# Patient Record
Sex: FEMALE | Race: WHITE | Employment: OTHER | URBAN - METROPOLITAN AREA
[De-identification: names, ages, dates, MRNs, and addresses within clinical notes are randomized per-mention and may not be internally consistent; named-entity substitution may affect disease eponyms.]

---

## 2017-05-05 LAB
ABO + RH BLD: NORMAL
ABO + RH BLD: NORMAL
HBV SURFACE AG SERPL QL IA: NEGATIVE
RUBELLA ANTIBODY IGG QUANTITATIVE: NORMAL IU/ML
T PALLIDUM IGG SER QL: NEGATIVE

## 2017-08-22 LAB — GROUP B STREP PCR: NEGATIVE

## 2017-09-14 ENCOUNTER — HOSPITAL ENCOUNTER (OUTPATIENT)
Facility: CLINIC | Age: 32
Discharge: HOME | End: 2017-09-14
Attending: OBSTETRICS & GYNECOLOGY | Admitting: OBSTETRICS & GYNECOLOGY
Payer: COMMERCIAL

## 2017-09-14 VITALS — HEIGHT: 63 IN | WEIGHT: 178 LBS | BODY MASS INDEX: 31.54 KG/M2 | TEMPERATURE: 98.6 F

## 2017-09-14 LAB — A1 MICROGLOB PLACENTAL VAG QL: NEGATIVE

## 2017-09-14 PROCEDURE — 59025 FETAL NON-STRESS TEST: CPT

## 2017-09-14 PROCEDURE — 84112 EVAL AMNIOTIC FLUID PROTEIN: CPT | Performed by: OBSTETRICS & GYNECOLOGY

## 2017-09-14 PROCEDURE — 99214 OFFICE O/P EST MOD 30 MIN: CPT | Mod: 25

## 2017-09-14 RX ORDER — PRENATAL VIT/IRON FUM/FOLIC AC 27MG-0.8MG
1 TABLET ORAL DAILY
COMMUNITY

## 2017-09-14 NOTE — IP AVS SNAPSHOT
MRN:[id number]                      After Visit Summary   9/14/2017    Brandie Pepper    MRN: [id number]           Thank you!     Thank you for choosing [facility] for your care. Our goal is always to provide you with excellent care. Hearing back from our patients is one way we can continue to improve our services. Please take a few minutes to complete the written survey that you may receive in the mail after you visit with us. Thank you!        Patient Information     Date Of Birth          1985        About your hospital stay     You were admitted on:  September 14, 2017 You last received care in the:  [facility]    You were discharged on:  September 14, 2017       Who to Call     For medical emergencies, please call 911.  For non-urgent questions about your medical care, please call your primary care provider or clinic, None          Attending Provider     Provider Specialty    Giovanna Grsisom MD OB/Gyn    Raf Key MD OB/Gyn       Primary Care Provider    Provider Not In System      Further instructions from your care team       Discharge Instruction for Undelivered Patients      You were seen for: Membrane Assessment  We Consulted: Dr. Grissom  You had (Test or Medicine):monitoring, cervical exam, and amnisure    Diet:   Drink 8 to 12 glasses of liquids (milk, juice, water) every day.  You may eat meals and snacks.     Activity:  Count fetal kicks everyday (see handout)  Call your doctor or nurse midwife if your baby is moving less than usual.     Call your provider if you notice:  Swelling in your face or increased swelling in your hands or legs.  Headaches that are not relieved by Tylenol (acetaminophen).  Changes in your vision (blurring: seeing spots or stars.)  Nausea (sick to your stomach) and vomiting (throwing up).   Weight gain of 5 pounds or more per week.  Heartburn that doesn't go away.  Signs of bladder infection: pain when you urinate (use the toilet),  "need to go more often and more urgently.  The bag of martinez (rupture of membranes) breaks, or you notice leaking in your underwear.  Bright red blood in your underwear.  Abdominal (lower belly) or stomach pain.  For first baby: Contractions (tightening) less than 5 minutes apart for one hour or more.  Second (plus) baby: Contractions (tightening) less than 10 minutes apart and getting stronger.  *If less than 34 weeks: Contractions (tightenings) more than 6 times in one hour.  Increase or change in vaginal discharge (note the color and amount)  Other:     Follow-up:  As scheduled in the clinic          Pending Results     No orders found from 2017 to 9/15/2017.            Admission Information     Date & Time Provider Department Dept. Phone    2017 Raf Key MD [facility] [phone]      Your Vitals Were     Temperature Height Weight BMI (Body Mass Index)          98.6  F (37  C) (Temporal) 1.6 m (5' 3\") 80.7 kg (178 lb) 31.53 kg/m2        [email or web address] Information     [employer] lets you send messages to your doctor, view your test results, renew your prescriptions, schedule appointments and more. To sign up, go to [email or web address] . Click on \"Log in\" on the left side of the screen, which will take you to the Welcome page. Then click on \"Sign up Now\" on the right side of the page.     You will be asked to enter the access code listed below, as well as some personal information. Please follow the directions to create your username and password.     Your access code is: [email or web address]  Expires: 2017  4:24 PM     Your access code will  in 90 days. If you need help or a new code, please call your [facility] clinic or [phone].        Care EveryWhere ID     This is your Care EveryWhere ID. This could be used by other organizations to access your [facility] medical records  [id number]        Equal Access to Services     JORGE ALBERTO CAIN AH: florinda Rivers " chadaylin, omero nagy, rosa trevinoaafelix ah. So [facility] [phone].    ATENCIÓN: Si tee villareal, tiene a low disposición servicios gratuitos de asistencia lingüística. Michael al [phone].    We comply with applicable federal civil rights laws and [place] laws. We do not discriminate on the basis of race, color, national origin, age, disability sex, sexual orientation or gender identity.               Review of your medicines      UNREVIEWED medicines. Ask your doctor about these medicines        Dose / Directions    prenatal multivitamin plus iron 27-0.8 MG Tabs per tablet        Dose:  1 tablet   Take 1 tablet by mouth daily   Refills:  0       tretinoin 0.025 % cream   Commonly known as:  RETIN-A   Used for:  Photoaging of skin        Apply to entire face at bedtime. Skip to every other night if too irritating.   Quantity:  45 g   Refills:  11       ZOLOFT PO        Dose:  25 mg   Take 25 mg by mouth daily   Refills:  0                Protect others around you: Learn how to safely use, store and throw away your medicines at www.disposemymeds.org.             Medication List: This is a list of all your medications and when to take them. Check marks below indicate your daily home schedule. Keep this list as a reference.      Medications           Morning Afternoon Evening Bedtime As Needed    prenatal multivitamin plus iron 27-0.8 MG Tabs per tablet   Take 1 tablet by mouth daily                                tretinoin 0.025 % cream   Commonly known as:  RETIN-A   Apply to entire face at bedtime. Skip to every other night if too irritating.                                ZOLOFT PO   Take 25 mg by mouth daily

## 2017-09-14 NOTE — PLAN OF CARE
Multip 39+4 weeks presents to [facility] stating that she noticed a small amount of fluid leaking at 1330 and is wondering if her water broke. Pt states having rare UCs, denies bleeding, denies any problems with this pregnancy. Monitor consent obtained and applied. Amnsure obtained, no fluid seen. SVE, patient 1/70/-3 which is no change from clinic. Discussed POC with patient.

## 2017-09-14 NOTE — IP AVS SNAPSHOT
[facility]    [address] [place] [address], [address]    [facility] [address]    Phone:  [phone]                                       After Visit Summary   9/14/2017    Brandie Pepper    MRN: [id number]           After Visit Summary Signature Page     I have received my discharge instructions, and my questions have been answered. I have discussed any challenges I see with this plan with the nurse or doctor.    ..........................................................................................................................................  Patient/Patient Representative Signature      ..........................................................................................................................................  Patient Representative Print Name and Relationship to Patient    ..................................................               ................................................  Date                                            Time    ..........................................................................................................................................  Reviewed by Signature/Title    ...................................................              ..............................................  Date                                                            Time

## 2017-09-14 NOTE — PLAN OF CARE
Amnisure negative. No bleeding or fluid noted. Pt states good FM. Pt having occ UCs, but only feeling occ tightening or a mild back ache with them. Dr. Grissom paged at 1620 and updated re: amnisure, no fluid seen, SVE, fht's, order received to d/c home. Discharge instructions given, pt to call MD if UCs get closer and stronger, LOF, bleeding, decreased FM, or any other questions or concerns. Pt states understanding. Pt has an appt at the clinic on Monday. D/c home walking.

## 2017-09-14 NOTE — DISCHARGE INSTRUCTIONS
Discharge Instruction for Undelivered Patients      You were seen for: Membrane Assessment  We Consulted: Dr. Grissom  You had (Test or Medicine):monitoring, cervical exam, and amnisure    Diet:   Drink 8 to 12 glasses of liquids (milk, juice, water) every day.  You may eat meals and snacks.     Activity:  Count fetal kicks everyday (see handout)  Call your doctor or nurse midwife if your baby is moving less than usual.     Call your provider if you notice:  Swelling in your face or increased swelling in your hands or legs.  Headaches that are not relieved by Tylenol (acetaminophen).  Changes in your vision (blurring: seeing spots or stars.)  Nausea (sick to your stomach) and vomiting (throwing up).   Weight gain of 5 pounds or more per week.  Heartburn that doesn't go away.  Signs of bladder infection: pain when you urinate (use the toilet), need to go more often and more urgently.  The bag of martinez (rupture of membranes) breaks, or you notice leaking in your underwear.  Bright red blood in your underwear.  Abdominal (lower belly) or stomach pain.  For first baby: Contractions (tightening) less than 5 minutes apart for one hour or more.  Second (plus) baby: Contractions (tightening) less than 10 minutes apart and getting stronger.  *If less than 34 weeks: Contractions (tightenings) more than 6 times in one hour.  Increase or change in vaginal discharge (note the color and amount)  Other:     Follow-up:  As scheduled in the clinic

## 2017-09-18 ENCOUNTER — ANESTHESIA (OUTPATIENT)
Dept: OBGYN | Facility: CLINIC | Age: 32
End: 2017-09-18
Payer: COMMERCIAL

## 2017-09-18 ENCOUNTER — HOSPITAL ENCOUNTER (INPATIENT)
Facility: CLINIC | Age: 32
LOS: 2 days | Discharge: HOME | End: 2017-09-20
Attending: OBSTETRICS & GYNECOLOGY | Admitting: OBSTETRICS & GYNECOLOGY
Payer: COMMERCIAL

## 2017-09-18 ENCOUNTER — ANESTHESIA EVENT (OUTPATIENT)
Dept: OBGYN | Facility: CLINIC | Age: 32
End: 2017-09-18
Payer: COMMERCIAL

## 2017-09-18 LAB
A1 MICROGLOB PLACENTAL VAG QL: POSITIVE
ABO + RH BLD: NORMAL
ABO + RH BLD: NORMAL
BLOOD BANK CMNT PATIENT-IMP: NORMAL
SPECIMEN EXP DATE BLD: NORMAL

## 2017-09-18 PROCEDURE — 3E0R3CZ INTRODUCTION OF REGIONAL ANESTHETIC INTO SPINAL CANAL, PERCUTANEOUS APPROACH: ICD-10-PCS | Performed by: ANESTHESIOLOGY

## 2017-09-18 PROCEDURE — 86901 BLOOD TYPING SEROLOGIC RH(D): CPT | Performed by: OBSTETRICS & GYNECOLOGY

## 2017-09-18 PROCEDURE — 25000125 ZZHC RX 250: Performed by: ANESTHESIOLOGY

## 2017-09-18 PROCEDURE — 37000011 ZZH ANESTHESIA WARD SERVICE

## 2017-09-18 PROCEDURE — 84112 EVAL AMNIOTIC FLUID PROTEIN: CPT | Performed by: OBSTETRICS & GYNECOLOGY

## 2017-09-18 PROCEDURE — 25000125 ZZHC RX 250: Performed by: OBSTETRICS & GYNECOLOGY

## 2017-09-18 PROCEDURE — 25000128 H RX IP 250 OP 636: Performed by: ANESTHESIOLOGY

## 2017-09-18 PROCEDURE — 25000128 H RX IP 250 OP 636: Performed by: OBSTETRICS & GYNECOLOGY

## 2017-09-18 PROCEDURE — 99215 OFFICE O/P EST HI 40 MIN: CPT | Mod: 25

## 2017-09-18 PROCEDURE — 25000132 ZZH RX MED GY IP 250 OP 250 PS 637: Performed by: OBSTETRICS & GYNECOLOGY

## 2017-09-18 PROCEDURE — 0KQM0ZZ REPAIR PERINEUM MUSCLE, OPEN APPROACH: ICD-10-PCS | Performed by: OBSTETRICS & GYNECOLOGY

## 2017-09-18 PROCEDURE — 00HU33Z INSERTION OF INFUSION DEVICE INTO SPINAL CANAL, PERCUTANEOUS APPROACH: ICD-10-PCS | Performed by: ANESTHESIOLOGY

## 2017-09-18 PROCEDURE — 36415 COLL VENOUS BLD VENIPUNCTURE: CPT | Performed by: OBSTETRICS & GYNECOLOGY

## 2017-09-18 PROCEDURE — 59025 FETAL NON-STRESS TEST: CPT

## 2017-09-18 PROCEDURE — 12000037 ZZH R&B POSTPARTUM INTERMEDIATE

## 2017-09-18 PROCEDURE — 86900 BLOOD TYPING SEROLOGIC ABO: CPT | Performed by: OBSTETRICS & GYNECOLOGY

## 2017-09-18 PROCEDURE — 86780 TREPONEMA PALLIDUM: CPT | Performed by: OBSTETRICS & GYNECOLOGY

## 2017-09-18 PROCEDURE — 72200001 ZZH LABOR CARE VAGINAL DELIVERY SINGLE

## 2017-09-18 RX ORDER — FENTANYL CITRATE 50 UG/ML
50-100 INJECTION, SOLUTION INTRAMUSCULAR; INTRAVENOUS
Status: DISCONTINUED | OUTPATIENT
Start: 2017-09-18 | End: 2017-09-18

## 2017-09-18 RX ORDER — NALOXONE HYDROCHLORIDE 0.4 MG/ML
.1-.4 INJECTION, SOLUTION INTRAMUSCULAR; INTRAVENOUS; SUBCUTANEOUS
Status: DISCONTINUED | OUTPATIENT
Start: 2017-09-18 | End: 2017-09-20 | Stop reason: HOSPADM

## 2017-09-18 RX ORDER — OXYTOCIN 10 [USP'U]/ML
10 INJECTION, SOLUTION INTRAMUSCULAR; INTRAVENOUS
Status: DISCONTINUED | OUTPATIENT
Start: 2017-09-18 | End: 2017-09-20 | Stop reason: HOSPADM

## 2017-09-18 RX ORDER — SODIUM CHLORIDE, SODIUM LACTATE, POTASSIUM CHLORIDE, CALCIUM CHLORIDE 600; 310; 30; 20 MG/100ML; MG/100ML; MG/100ML; MG/100ML
INJECTION, SOLUTION INTRAVENOUS CONTINUOUS
Status: DISCONTINUED | OUTPATIENT
Start: 2017-09-18 | End: 2017-09-18

## 2017-09-18 RX ORDER — IBUPROFEN 400 MG/1
400-800 TABLET, FILM COATED ORAL EVERY 6 HOURS PRN
Status: DISCONTINUED | OUTPATIENT
Start: 2017-09-18 | End: 2017-09-20 | Stop reason: HOSPADM

## 2017-09-18 RX ORDER — OXYTOCIN/0.9 % SODIUM CHLORIDE 30/500 ML
100-340 PLASTIC BAG, INJECTION (ML) INTRAVENOUS CONTINUOUS PRN
Status: DISCONTINUED | OUTPATIENT
Start: 2017-09-18 | End: 2017-09-18

## 2017-09-18 RX ORDER — OXYTOCIN 10 [USP'U]/ML
10 INJECTION, SOLUTION INTRAMUSCULAR; INTRAVENOUS
Status: DISCONTINUED | OUTPATIENT
Start: 2017-09-18 | End: 2017-09-18

## 2017-09-18 RX ORDER — CARBOPROST TROMETHAMINE 250 UG/ML
250 INJECTION, SOLUTION INTRAMUSCULAR
Status: DISCONTINUED | OUTPATIENT
Start: 2017-09-18 | End: 2017-09-18

## 2017-09-18 RX ORDER — BISACODYL 10 MG
10 SUPPOSITORY, RECTAL RECTAL DAILY PRN
Status: DISCONTINUED | OUTPATIENT
Start: 2017-09-20 | End: 2017-09-20 | Stop reason: HOSPADM

## 2017-09-18 RX ORDER — OXYCODONE AND ACETAMINOPHEN 5; 325 MG/1; MG/1
1 TABLET ORAL
Status: DISCONTINUED | OUTPATIENT
Start: 2017-09-18 | End: 2017-09-18

## 2017-09-18 RX ORDER — ACETAMINOPHEN 325 MG/1
650 TABLET ORAL EVERY 4 HOURS PRN
Status: DISCONTINUED | OUTPATIENT
Start: 2017-09-18 | End: 2017-09-18

## 2017-09-18 RX ORDER — ACETAMINOPHEN 325 MG/1
650 TABLET ORAL EVERY 4 HOURS PRN
Status: DISCONTINUED | OUTPATIENT
Start: 2017-09-18 | End: 2017-09-20 | Stop reason: HOSPADM

## 2017-09-18 RX ORDER — OXYTOCIN/0.9 % SODIUM CHLORIDE 30/500 ML
100 PLASTIC BAG, INJECTION (ML) INTRAVENOUS CONTINUOUS
Status: DISCONTINUED | OUTPATIENT
Start: 2017-09-18 | End: 2017-09-20 | Stop reason: HOSPADM

## 2017-09-18 RX ORDER — LIDOCAINE 40 MG/G
CREAM TOPICAL
Status: DISCONTINUED | OUTPATIENT
Start: 2017-09-18 | End: 2017-09-18

## 2017-09-18 RX ORDER — LANOLIN 100 %
OINTMENT (GRAM) TOPICAL
Status: DISCONTINUED | OUTPATIENT
Start: 2017-09-18 | End: 2017-09-20 | Stop reason: HOSPADM

## 2017-09-18 RX ORDER — OXYTOCIN/0.9 % SODIUM CHLORIDE 30/500 ML
340 PLASTIC BAG, INJECTION (ML) INTRAVENOUS CONTINUOUS PRN
Status: DISCONTINUED | OUTPATIENT
Start: 2017-09-18 | End: 2017-09-20 | Stop reason: HOSPADM

## 2017-09-18 RX ORDER — NALOXONE HYDROCHLORIDE 0.4 MG/ML
.1-.4 INJECTION, SOLUTION INTRAMUSCULAR; INTRAVENOUS; SUBCUTANEOUS
Status: DISCONTINUED | OUTPATIENT
Start: 2017-09-18 | End: 2017-09-18

## 2017-09-18 RX ORDER — HYDROCORTISONE 2.5 %
CREAM (GRAM) TOPICAL 3 TIMES DAILY PRN
Status: DISCONTINUED | OUTPATIENT
Start: 2017-09-18 | End: 2017-09-20 | Stop reason: HOSPADM

## 2017-09-18 RX ORDER — NALBUPHINE HYDROCHLORIDE 10 MG/ML
2.5-5 INJECTION, SOLUTION INTRAMUSCULAR; INTRAVENOUS; SUBCUTANEOUS EVERY 6 HOURS PRN
Status: DISCONTINUED | OUTPATIENT
Start: 2017-09-18 | End: 2017-09-18

## 2017-09-18 RX ORDER — MISOPROSTOL 200 UG/1
400 TABLET ORAL
Status: DISCONTINUED | OUTPATIENT
Start: 2017-09-18 | End: 2017-09-20 | Stop reason: HOSPADM

## 2017-09-18 RX ORDER — IBUPROFEN 400 MG/1
800 TABLET, FILM COATED ORAL
Status: DISCONTINUED | OUTPATIENT
Start: 2017-09-18 | End: 2017-09-18

## 2017-09-18 RX ORDER — METHYLERGONOVINE MALEATE 0.2 MG/ML
200 INJECTION INTRAVENOUS
Status: DISCONTINUED | OUTPATIENT
Start: 2017-09-18 | End: 2017-09-18

## 2017-09-18 RX ORDER — LIDOCAINE HYDROCHLORIDE AND EPINEPHRINE 15; 5 MG/ML; UG/ML
3 INJECTION, SOLUTION EPIDURAL
Status: COMPLETED | OUTPATIENT
Start: 2017-09-18 | End: 2017-09-18

## 2017-09-18 RX ORDER — OXYTOCIN/0.9 % SODIUM CHLORIDE 30/500 ML
1-24 PLASTIC BAG, INJECTION (ML) INTRAVENOUS CONTINUOUS
Status: DISCONTINUED | OUTPATIENT
Start: 2017-09-18 | End: 2017-09-18

## 2017-09-18 RX ORDER — EPHEDRINE SULFATE 50 MG/ML
5 INJECTION, SOLUTION INTRAMUSCULAR; INTRAVENOUS; SUBCUTANEOUS
Status: DISCONTINUED | OUTPATIENT
Start: 2017-09-18 | End: 2017-09-18

## 2017-09-18 RX ORDER — ONDANSETRON 2 MG/ML
4 INJECTION INTRAMUSCULAR; INTRAVENOUS EVERY 6 HOURS PRN
Status: DISCONTINUED | OUTPATIENT
Start: 2017-09-18 | End: 2017-09-18

## 2017-09-18 RX ORDER — ROPIVACAINE HYDROCHLORIDE 2 MG/ML
10 INJECTION, SOLUTION EPIDURAL; INFILTRATION; PERINEURAL ONCE
Status: COMPLETED | OUTPATIENT
Start: 2017-09-18 | End: 2017-09-18

## 2017-09-18 RX ORDER — AMOXICILLIN 250 MG
1-2 CAPSULE ORAL 2 TIMES DAILY
Status: DISCONTINUED | OUTPATIENT
Start: 2017-09-18 | End: 2017-09-20 | Stop reason: HOSPADM

## 2017-09-18 RX ADMIN — IBUPROFEN 800 MG: 400 TABLET ORAL at 20:20

## 2017-09-18 RX ADMIN — ACETAMINOPHEN 650 MG: 325 TABLET, FILM COATED ORAL at 20:20

## 2017-09-18 RX ADMIN — OXYTOCIN-SODIUM CHLORIDE 0.9% IV SOLN 30 UNIT/500ML 2 MILLI-UNITS/MIN: 30-0.9/5 SOLUTION at 11:53

## 2017-09-18 RX ADMIN — Medication 12 ML/HR: at 15:50

## 2017-09-18 RX ADMIN — SODIUM CHLORIDE, POTASSIUM CHLORIDE, SODIUM LACTATE AND CALCIUM CHLORIDE: 600; 310; 30; 20 INJECTION, SOLUTION INTRAVENOUS at 11:49

## 2017-09-18 RX ADMIN — OXYTOCIN-SODIUM CHLORIDE 0.9% IV SOLN 30 UNIT/500ML 100 ML/HR: 30-0.9/5 SOLUTION at 18:00

## 2017-09-18 RX ADMIN — ROPIVACAINE HYDROCHLORIDE 10 ML: 2 INJECTION, SOLUTION EPIDURAL; INFILTRATION at 15:37

## 2017-09-18 RX ADMIN — LIDOCAINE HYDROCHLORIDE AND EPINEPHRINE 3 ML: 15; 5 INJECTION, SOLUTION EPIDURAL at 15:34

## 2017-09-18 NOTE — IP AVS SNAPSHOT
MRN:[id number]                      After Visit Summary   9/18/2017    Brandie Pepper    MRN: [id number]           Thank you!     Thank you for choosing [facility] for your care. Our goal is always to provide you with excellent care. Hearing back from our patients is one way we can continue to improve our services. Please take a few minutes to complete the written survey that you may receive in the mail after you visit with us. Thank you!        Patient Information     Date Of Birth          1985        About your hospital stay     You were admitted on:  September 18, 2017 You last received care in the:  [address]    You were discharged on:  September 20, 2017       Who to Call     For medical emergencies, please call 911.  For non-urgent questions about your medical care, please call your primary care provider or clinic, None          Attending Provider     Provider Specialty    Alexia Wayne MD OB/Gyn    Luna Nuñez MD OB/Gyn    Belinda Leary MD OB/Gyn       Primary Care Provider    Provider Not In System      After Care Instructions     Activity       Review discharge instructions            Activity       Review discharge instructions            Diet       Resume previous diet            Diet       Resume previous diet            Discharge Instructions - Gestational diabetic patients       Gestational diabetic patients to follow up for fasting blood sugar and 2 hour 75gm glucose load at 6 weeks postpartum.            Discharge Instructions - Postpartum visit       Schedule postpartum visit with your provider and return to clinic in 6 weeks.            Discharge Instructions - Postpartum visit       Schedule postpartum visit with your provider and return to clinic in 6 weeks.                  Further instructions from your care team       Postpartum Vaginal Delivery Instructions    Activity       Ask family and friends for help  when you need it.    Do not place anything in your vagina for 6 weeks.    You are not restricted on other activities, but take it easy for a few weeks to allow your body to recover from delivery.  You are able to do any activities you feel up to that point.    No driving until you have stopped taking your pain medications (usually two weeks after delivery).     Call your health care provider if you have any of these symptoms:       Increased pain, swelling, redness, or fluid around your stiches from an episiotomy or perineal tear.    A fever above 100.4 F (38 C) with or without chills when placing a thermometer under your tongue.    You soak a sanitary pad with blood within 1 hour, or you see blood clots larger than a golf ball.    Bleeding that lasts more than 6 weeks.    Vaginal discharge that smells bad.    Severe pain, cramping or tenderness in your lower belly area.    A need to urinate more frequently (use the toilet more often), more urgently (use the toilet very quickly), or it burns when you urinate.    Nausea and vomiting.    Redness, swelling or pain around a vein in your leg.    Problems breastfeeding or a red or painful area on your breast.    Chest pain and cough or are gasping for air.    Problems coping with sadness, anxiety, or depression.  If you have any concerns about hurting yourself or the baby, call your provider immediately.     You have questions or concerns after you return home.     Keep your hands clean:  Always wash your hands before touching your perineal area and stitches.  This helps reduce your risk of infection.  If your hands aren't dirty, you may use an alcohol hand-rub to clean your hands. Keep your nails clean and short.        Pending Results     No orders found from 9/16/2017 to 9/19/2017.            Statement of Approval     Ordered          09/20/17 0823  I have reviewed and agree with all the recommendations and orders detailed in this document.  EFFECTIVE NOW     Approved  "and electronically signed by:  Michaela Sherrie Sruthi, DAVID CNM           17 0900  I have reviewed and agree with all the recommendations and orders detailed in this document.  EFFECTIVE NOW     Approved and electronically signed by:  Josephine Landa MD             Admission Information     Date & Time Provider Department Dept. Phone    2017 Belinda Leary MD [address] [phone]      Your Vitals Were     Blood Pressure Pulse Temperature Respirations Pulse Oximetry       116/62 57 97.8  F (36.6  C) (Oral) 16 99%       MyChart Information     [employer] lets you send messages to your doctor, view your test results, renew your prescriptions, schedule appointments and more. To sign up, go to [email or web address] . Click on \"Log in\" on the left side of the screen, which will take you to the Welcome page. Then click on \"Sign up Now\" on the right side of the page.     You will be asked to enter the access code listed below, as well as some personal information. Please follow the directions to create your username and password.     Your access code is: [email or web address]  Expires: 2017  4:24 PM     Your access code will  in 90 days. If you need help or a new code, please call your [facility] clinic or [phone].        Care EveryWhere ID     This is your Care EveryWhere ID. This could be used by other organizations to access your [facility] medical records  [id number]        Equal Access to Services     [facility]: Hadii aad ku hadasho Soomaali, waaxda luqadaha, qaybta kaalmada adeegyada, rosa gottlieb . So [facility] [phone].    ATENCIÓN: Si habla español, tiene a low disposición servicios gratuitos de asistencia lingüística. Llame al [phone].    We comply with applicable federal civil rights laws and [place] laws. We do not discriminate on the basis of race, color, national origin, age, disability sex, sexual orientation or " gender identity.               Review of your medicines      CONTINUE these medicines which have NOT CHANGED        Dose / Directions    prenatal multivitamin plus iron 27-0.8 MG Tabs per tablet        Dose:  1 tablet   Take 1 tablet by mouth daily   Refills:  0       tretinoin 0.025 % cream   Commonly known as:  RETIN-A   Used for:  Photoaging of skin        Apply to entire face at bedtime. Skip to every other night if too irritating.   Quantity:  45 g   Refills:  11       ZOLOFT PO        Dose:  25 mg   Take 25 mg by mouth daily   Refills:  0                Protect others around you: Learn how to safely use, store and throw away your medicines at www.disposemymeds.org.             Medication List: This is a list of all your medications and when to take them. Check marks below indicate your daily home schedule. Keep this list as a reference.      Medications           Morning Afternoon Evening Bedtime As Needed    prenatal multivitamin plus iron 27-0.8 MG Tabs per tablet   Take 1 tablet by mouth daily                                tretinoin 0.025 % cream   Commonly known as:  RETIN-A   Apply to entire face at bedtime. Skip to every other night if too irritating.                                ZOLOFT PO   Take 25 mg by mouth daily   Last time this was given:  50 mg on 9/19/2017  8:20 PM

## 2017-09-18 NOTE — IP AVS SNAPSHOT
[address]    [place] [address]    Phone:  [phone]                                       After Visit Summary   9/18/2017    Brandie Pepper    MRN: [id number]           After Visit Summary Signature Page     I have received my discharge instructions, and my questions have been answered. I have discussed any challenges I see with this plan with the nurse or doctor.    ..........................................................................................................................................  Patient/Patient Representative Signature      ..........................................................................................................................................  Patient Representative Print Name and Relationship to Patient    ..................................................               ................................................  Date                                            Time    ..........................................................................................................................................  Reviewed by Signature/Title    ...................................................              ..............................................  Date                                                            Time

## 2017-09-18 NOTE — ANESTHESIA PREPROCEDURE EVALUATION
Anesthesia Evaluation     .      No history of anesthetic complications          ROS/MED HX    ENT/Pulmonary:  - neg pulmonary ROS     Neurologic:  - neg neurologic ROS     Cardiovascular:  - neg cardiovascular ROS       METS/Exercise Tolerance:     Hematologic:         Musculoskeletal:         GI/Hepatic:  - neg GI/hepatic ROS       Renal/Genitourinary:         Endo:         Psychiatric:         Infectious Disease:         Malignancy:         Other:                     Physical Exam      Airway   Mallampati: II  TM distance: > 3 FB  Neck ROM: full    Dental     Cardiovascular       Pulmonary           neg OB ROS                 Anesthesia Plan      History & Physical Review      ASA Status:  .  OB Epidural Asa: 2            Postoperative Care      Consents  Anesthetic plan, risks, benefits and alternatives discussed with:  Patient..                          .

## 2017-09-18 NOTE — PLAN OF CARE
Multip admitted to [facility] ambulatory per services of Dr. Wayne for evaluation of labor.  Pt states she has been up since 0300 with uncomfortable pressure and contractions.  Denies LOF or bloody show.  Reports good fetal movement.  Discussed plan of care including EFM with NST, routine VS and SVE.  Pt agreeable.  EFM applied and admission assessment completed.

## 2017-09-18 NOTE — PLAN OF CARE
Patient returned from walking. States UCs are about the same, she feels occasional cramping but mainly tightening. Pt states she has felt some fluid leaking and unsure if it's gel or if maybe her water broke. Amnisure obtained and no fluid seen. SVE done, no change. Awaiting amnisure results, Discussed POC with pt who states understanding.

## 2017-09-18 NOTE — PLAN OF CARE
Amnisure positive. Dr. Nuñez paged and updated re: positive amnisure, SVE, patient's UC pattern, reactive NST, and patient's history. Order received to admit to labor and delivery, standard intrapartum orders, and Dr. Nuñez will check in in about an hour for further orders. Discussed POC with pt who states understanding. To room 220 walking and report given to Beverley Silverio RN who will assume care.

## 2017-09-18 NOTE — H&P
[facility] Labor and Delivery Progress Note    Brandie Pepper MRN# [id number]   Age: 32 year old YOB: 1985     Refer to prenatal record for full H&P        Subjective:     32 yr old  admitted at 40+1 wks with SROM (sm leak of fluid and + amnisure ) ; occ cxts.  Prenatal care was uncomplicated.          Objective:   Patient Vitals for the past 8 hrs:   BP Temp Temp src Resp   17 0650 127/71 98.1  F (36.7  C) Temporal 16        Cervical Exam:  /       Position: mid  Membranes: Ruptured   - no forebag palpated    Fetal Heart Rate:    Monitor: External US    Variability: Moderate    Baseline Rate: 135 bpm    Fetal Heart Rate Tracing:cat 1    vtx EFW 8 1#  GBS neg  Rh neg ( Rhogam at 28 wks)        Assessment:   1)  IUP at  40w1d    2)  SROM, prodromal labor        Plan:   Labor augmentation with Pitocin  Plans epidural       Luna Tanya Nuñez MD

## 2017-09-18 NOTE — ANESTHESIA PROCEDURE NOTES
Peripheral nerve/Neuraxial procedure note : epidural catheter  Pre-Procedure  Performed by EARLENE PEDRAZA  Location: OB      Pre-Anesthestic Checklist: patient identified, IV checked, site marked, risks and benefits discussed, informed consent, monitors and equipment checked, pre-op evaluation, at physician/surgeon's request and post-op pain management    Timeout  Correct Patient: Yes   Correct Procedure: Yes   Correct Site: Yes   Correct Laterality: Yes   Correct Position: Yes   Site Marked: Yes   .   Procedure Documentation    .    Procedure:    Epidural catheter.  Insertion Site:L3-4  (midline approach) Injection technique: LORT saline   Local skin infiltrated with 3 mL of 1% lidocaine.  KEYONNA at 6 cm     Patient Prep;mask, sterile gloves, povidone-iodine 7.5% surgical scrub, patient draped.  .  Needle: Touhy needle Needle Gauge: 17.    Needle Length (Inches) 3.5  # of attempts: 1 and # of redirects:  .   Catheter: 19 G . .  Catheter threaded easily  .  10 cm at skin.   .    Assessment/Narrative  Paresthesias: No.  .  .  Aspiration negative for heme or CSF  . Test dose of 3 mL lidocaine 1.5% w/ 1:200,000 epinephrine at. Test dose negative for signs of intravascular, subdural or intrathecal injection. Comments:  No complications

## 2017-09-19 LAB
ABO + RH BLD: NORMAL
ABO + RH BLD: NORMAL
BLOOD BANK CMNT PATIENT-IMP: NORMAL
DATE RH IMM GL GVN: NORMAL
FETAL CELL SCN BLD QL ROSETTE: NORMAL
HGB BLD-MCNC: 11.9 G/DL (ref 11.7–15.7)
RH IG VIALS RECOM PATIENT: NORMAL
T PALLIDUM IGG+IGM SER QL: NEGATIVE

## 2017-09-19 PROCEDURE — 36415 COLL VENOUS BLD VENIPUNCTURE: CPT | Performed by: OBSTETRICS & GYNECOLOGY

## 2017-09-19 PROCEDURE — 25000132 ZZH RX MED GY IP 250 OP 250 PS 637: Performed by: OBSTETRICS & GYNECOLOGY

## 2017-09-19 PROCEDURE — 85461 HEMOGLOBIN FETAL: CPT | Performed by: OBSTETRICS & GYNECOLOGY

## 2017-09-19 PROCEDURE — 86900 BLOOD TYPING SEROLOGIC ABO: CPT | Performed by: OBSTETRICS & GYNECOLOGY

## 2017-09-19 PROCEDURE — 86901 BLOOD TYPING SEROLOGIC RH(D): CPT | Performed by: OBSTETRICS & GYNECOLOGY

## 2017-09-19 PROCEDURE — 12000037 ZZH R&B POSTPARTUM INTERMEDIATE

## 2017-09-19 PROCEDURE — 25000128 H RX IP 250 OP 636: Performed by: OBSTETRICS & GYNECOLOGY

## 2017-09-19 PROCEDURE — 85018 HEMOGLOBIN: CPT | Performed by: OBSTETRICS & GYNECOLOGY

## 2017-09-19 RX ADMIN — IBUPROFEN 800 MG: 400 TABLET ORAL at 03:47

## 2017-09-19 RX ADMIN — ACETAMINOPHEN 650 MG: 325 TABLET, FILM COATED ORAL at 20:19

## 2017-09-19 RX ADMIN — ACETAMINOPHEN 650 MG: 325 TABLET, FILM COATED ORAL at 15:09

## 2017-09-19 RX ADMIN — SERTRALINE HYDROCHLORIDE 50 MG: 50 TABLET ORAL at 20:20

## 2017-09-19 RX ADMIN — SENNOSIDES AND DOCUSATE SODIUM 1 TABLET: 8.6; 5 TABLET ORAL at 09:33

## 2017-09-19 RX ADMIN — IBUPROFEN 800 MG: 400 TABLET ORAL at 20:19

## 2017-09-19 RX ADMIN — HUMAN RHO(D) IMMUNE GLOBULIN 300 MCG: 300 INJECTION, SOLUTION INTRAMUSCULAR at 20:27

## 2017-09-19 RX ADMIN — ACETAMINOPHEN 650 MG: 325 TABLET, FILM COATED ORAL at 03:47

## 2017-09-19 RX ADMIN — ACETAMINOPHEN 650 MG: 325 TABLET, FILM COATED ORAL at 09:32

## 2017-09-19 RX ADMIN — IBUPROFEN 800 MG: 400 TABLET ORAL at 15:09

## 2017-09-19 RX ADMIN — IBUPROFEN 800 MG: 400 TABLET ORAL at 09:33

## 2017-09-19 RX ADMIN — SENNOSIDES AND DOCUSATE SODIUM 2 TABLET: 8.6; 5 TABLET ORAL at 20:19

## 2017-09-19 NOTE — PROGRESS NOTES
[facility]   Post-Partum Progress Note        Interval History:   Doing well.  Pain is adequately controlled.  No fevers.  No history of foul-smelling vaginal discharge.  Good appetite.  Denies chest pain, shortness of breath, nausea or vomiting.  Vaginal bleeding is similar to a heavy menstrual flow.  Breastfeeding well. Pt may want discharge tonight at 4 hours. Undecided. Hx pp anxiety last preg. Increased her Zoloft to 50 mg at end of pregnancy in anticipation. No anxiety today. Slightly teary. Has not slept well in weeks. Signs and sx of pp depression reviewed. Measures reviewed           Medications:   All medications related to the patient's surgery have been reviewed          Physical Exam:   All vitals stable  Blood pressure 114/63, pulse 58, temperature 97.7  F (36.5  C), temperature source Oral, resp. rate 18, SpO2 99 %, unknown if currently breastfeeding.  Uterine fundus is firm, non-tender and at the level of the umbilicus  Perineal sutures intact and wound healing well          Data:   No results found for: HGB         Assessment and Plan:    Assessment:   Post-partum day #1  Normal spontaneous vaginal delivery  :     Doing well.  No excessive bleeding  Pain well-controlled.   Plan:   Ambulation encouraged  Pain control measures as needed  Discharge later today if pt desires. Cancel d/c if pt decides to stay until tomorrow  RTC in 6 weeks, sooner prn.   Signs and sx of pp depression reviewed at length  Continue Zoloft 50 mg po qd     Josephine Landa

## 2017-09-19 NOTE — PLAN OF CARE
Problem: Goal Outcome Summary  Goal: Goal Outcome Summary  Outcome: No Change  Safety and orientated to the Room was the patient and Spouse. Patient is due to void. Sloc IV,  Tylenol and Ibuprofen were given earlier to the patient and at this time denies pain. Patient took Zoloft 50 mg this am for her anxiety which was increased from her daily dose of 25 mg. She has a history of anxiety attack post delivery. Independent with baby cares and spouse is in the room.

## 2017-09-19 NOTE — LACTATION NOTE
Initial Lactation visit. Hand out given. Recommend unlimited, frequent breast feedings: At least 8 - 12 times every 24 hours. Avoid pacifiers and supplementation with formula unless medically indicated. Explained benefits of holding baby skin on skin to help promote better breastfeeding outcomes. Will revisit as needed.    Floridalma Arango RN IBCLC

## 2017-09-19 NOTE — PLAN OF CARE
Problem: Goal Outcome Summary  Goal: Goal Outcome Summary  Outcome: Improving  Patient is up independently, voiding without difficulty, pain well controlled with medications given as prescribed. Encouraged to continue to ambulate as able and void frequently. Patient decided to stay until tomorrow AM. Will defer discharge as per Dr. Landa's note.

## 2017-09-19 NOTE — ANESTHESIA POSTPROCEDURE EVALUATION
Patient: Brandie Pepper    * No procedures listed *    Diagnosis:* No pre-op diagnosis entered *  Diagnosis Additional Information: No value filed.    Anesthesia Type:  No value filed.    Note:  Anesthesia Post Evaluation    Patient location during evaluation: Bedside  Patient participation: Able to fully participate in evaluation  Level of consciousness: awake  Pain management: adequate  Airway patency: patent  Cardiovascular status: acceptable  Respiratory status: acceptable  Hydration status: acceptable  PONV: controlled     Anesthetic complications: None          Last vitals:  Vitals:    09/19/17 0101 09/19/17 0347 09/19/17 0810   BP: 121/69  114/63   Pulse: 52  58   Resp:  18 18   Temp: 36.5  C (97.7  F)  36.5  C (97.7  F)   SpO2:            Electronically Signed By: Crissy Chan MD  September 19, 2017  3:45 PM

## 2017-09-19 NOTE — PLAN OF CARE
Data: Brandie Pepper transferred to [facility] via wheelchair at 2110. Baby transferred via parent's arms.  Action: Receiving unit notified of transfer: Yes. Patient and family notified of room change. Report given to Ria THAPA at 2120. Belongings sent to receiving unit. Accompanied by Registered Nurse. Oriented patient to surroundings. Call light within reach. ID bands double-checked with receiving RN.  Response: Patient tolerated transfer and is stable.    Patient unable to void prior to transfer. FF SR F@U upon arrival to unit.

## 2017-09-19 NOTE — PLAN OF CARE
Problem: Goal Outcome Summary  Goal: Goal Outcome Summary  Outcome: Improving  Vital signs stable. Baby breastfeeding well every two to three hours, skin to skin and on demand feedings encouraged. Patient independently ambulating and adequately voiding. Voided X2. IV removed. Pain managed with tylenol and ibuprofen. Questions and concerns addressed. Will continue to monitor.

## 2017-09-19 NOTE — L&D DELIVERY NOTE
INDICATIONS:  Brandie Pepper is a 32-year-old  4, para 2-0-1-2 female admitted at 40+1 weeks gestational age with spontaneous rupture of membranes confirmed by a positive AmniSure with occasional contractions.  She progressed in spontaneous labor.  Her pregnancy is complicated by being Rh negative.  She received RhoGAM on 2017.  She transferred care to our office at 28 weeks.  She has a history of anxiety, on Zoloft and had a history of a kidney stone this pregnancy which she passed in [place], [place] in the emergency room.      FIRST STAGE:  The patient was started on Pitocin augmentation for maximum rate of 5 milliunits.  She progressed through a normal labor curve to a complete dilation at 4:30 p.m.  At that time she was quite numb from her epidural, which she had received for labor analgesia.  She was allowed to labor down as the fetal tracing became category 1.  She labored down until she began feeling pressure and then preparations were made to begin to push.        SECOND STAGE:  The patient began expulsive efforts and rapidly over 2 contractions, brought the fetal vertex to a crown and at 5:21 p.m. she delivered  a vigorous male infant from a right occiput anterior presentation.  The shoulders and body delivered atraumatically.  The infant was placed on the maternal abdomen.  There was a vigorous cry.  Delayed cord clamping was performed for 60 seconds.  The cord was then doubly clamped and then cut by the father of the baby.      THIRD STAGE:  The placenta delivered spontaneously and intact with gentle cord traction at 5:23 p.m.  A 3-vessel cord was noted.  Intravenous Pitocin was administered and good uterine tone was noted.  The perineum was inspected and there was a second-degree midline vaginal laceration which was repaired with 3-0 Vicryl suture in a running continuous interlocking fashion in layers in the usual manner without complication.  Estimated blood loss was 500 mL.  Uterine  tone was excellent and uterine bleeding was scant.  Postpartum condition is satisfactory for both mother and infant.         SHE YOUNGER MD             D: 2017 17:48   T: 2017 23:00   MT: [id number]      Name:     FADI JOSE   MRN:      [id number]        Account:        [id number]   :      1985           Delivery Date:  2017      Document: [id number]       cc: She Younger MD

## 2017-09-20 VITALS
RESPIRATION RATE: 16 BRPM | HEART RATE: 57 BPM | DIASTOLIC BLOOD PRESSURE: 62 MMHG | TEMPERATURE: 97.8 F | OXYGEN SATURATION: 99 % | SYSTOLIC BLOOD PRESSURE: 116 MMHG

## 2017-09-20 PROCEDURE — 25000132 ZZH RX MED GY IP 250 OP 250 PS 637: Performed by: OBSTETRICS & GYNECOLOGY

## 2017-09-20 RX ADMIN — SENNOSIDES AND DOCUSATE SODIUM 2 TABLET: 8.6; 5 TABLET ORAL at 08:33

## 2017-09-20 RX ADMIN — ACETAMINOPHEN 650 MG: 325 TABLET, FILM COATED ORAL at 02:32

## 2017-09-20 RX ADMIN — IBUPROFEN 800 MG: 400 TABLET ORAL at 02:32

## 2017-09-20 RX ADMIN — IBUPROFEN 800 MG: 400 TABLET ORAL at 08:34

## 2017-09-20 RX ADMIN — ACETAMINOPHEN 650 MG: 325 TABLET, FILM COATED ORAL at 08:34

## 2017-09-20 NOTE — PLAN OF CARE
Problem: Goal Outcome Summary  Goal: Goal Outcome Summary  Outcome: Improving  VSS, breastfeeding well with minimal assistance, pain controlled with Tylenol and Ibuprofen, states satisfaction with pain management, up independently with no complaints of dizziness,  at bedside, interacting and bonding well with infant, encouraged to call with questions or concerns, will continue to monitor.

## 2017-09-20 NOTE — PLAN OF CARE
Problem: Goal Outcome Summary  Goal: Goal Outcome Summary  Outcome: Adequate for Discharge Date Met:  09/20/17  AVSS. Up independently in room. Independent with baby cares. Plan for discharge home today.  at bedside.

## 2017-09-20 NOTE — PLAN OF CARE
Problem: Goal Outcome Summary  Goal: Goal Outcome Summary  Outcome: Improving  VSS. Fundus firm and midline. Pain 2/10, decreased with tylenol and ibuprofen. Rhogam given. Ambulating free of dizziness or lighthead. Voiding without difficulty. Breastfeeding feeding. Encouraged to call with needs, questions, or concerns. Will continue to monitor.

## 2017-09-20 NOTE — PROGRESS NOTES
[facility]   Post-Partum Progress Note        Interval History:   Doing well.  Pain is adequately controlled.  No fevers.  No history of foul-smelling vaginal discharge.  Good appetite.  Denies chest pain, shortness of breath, nausea or vomiting.  Vaginal bleeding is similar to a heavy menstrual flow.  Breastfeeding well.           Medications:   All medications related to the patient's surgery have been reviewed          Physical Exam:   All vitals stable  Blood pressure 116/62, pulse 57, temperature 97.8  F (36.6  C), temperature source Oral, resp. rate 16, SpO2 99 %, unknown if currently breastfeeding.  Exam deferred due to pt breastfeeding during assessment.             Data:     Hemoglobin   Date Value Ref Range Status   09/19/2017 11.9 11.7 - 15.7 g/dL Final            Assessment and Plan:    Assessment:   Post-partum day #2  Normal spontaneous vaginal delivery  :     Doing well.   Plan:   Discharge later today     Sherrie Jennings CNM

## 2019-12-12 LAB
ABO + RH BLD: NORMAL
ABO + RH BLD: NORMAL
BLD GP AB SCN SERPL QL: NEGATIVE
HBV SURFACE AG SERPL QL IA: NORMAL
HIV 1+2 AB+HIV1 P24 AG SERPL QL IA: NORMAL
RUBELLA ANTIBODY IGG QUANTITATIVE: NORMAL IU/ML
TREPONEMA ANTIBODIES: NORMAL

## 2020-01-15 ENCOUNTER — MEDICAL CORRESPONDENCE (OUTPATIENT)
Dept: HEALTH INFORMATION MANAGEMENT | Facility: CLINIC | Age: 35
End: 2020-01-15

## 2020-01-15 ENCOUNTER — TRANSFERRED RECORDS (OUTPATIENT)
Dept: HEALTH INFORMATION MANAGEMENT | Facility: CLINIC | Age: 35
End: 2020-01-15

## 2020-01-15 ENCOUNTER — TRANSCRIBE ORDERS (OUTPATIENT)
Dept: MATERNAL FETAL MEDICINE | Facility: CLINIC | Age: 35
End: 2020-01-15

## 2020-01-15 DIAGNOSIS — O26.90 PREGNANCY RELATED CONDITION, ANTEPARTUM: Primary | ICD-10-CM

## 2020-02-10 ENCOUNTER — PRE VISIT (OUTPATIENT)
Dept: MATERNAL FETAL MEDICINE | Facility: CLINIC | Age: 35
End: 2020-02-10

## 2020-02-11 ENCOUNTER — HOSPITAL ENCOUNTER (OUTPATIENT)
Dept: ULTRASOUND IMAGING | Facility: CLINIC | Age: 35
Discharge: HOME | End: 2020-02-11
Attending: OBSTETRICS & GYNECOLOGY | Admitting: OBSTETRICS & GYNECOLOGY
Payer: COMMERCIAL

## 2020-02-11 ENCOUNTER — OFFICE VISIT (OUTPATIENT)
Dept: MATERNAL FETAL MEDICINE | Facility: CLINIC | Age: 35
End: 2020-02-11
Attending: OBSTETRICS & GYNECOLOGY
Payer: COMMERCIAL

## 2020-02-11 DIAGNOSIS — O26.90 PREGNANCY RELATED CONDITION, ANTEPARTUM: ICD-10-CM

## 2020-02-11 DIAGNOSIS — O09.522 MULTIGRAVIDA OF ADVANCED MATERNAL AGE IN SECOND TRIMESTER: Primary | ICD-10-CM

## 2020-02-11 PROCEDURE — 76811 OB US DETAILED SNGL FETUS: CPT

## 2020-02-11 PROCEDURE — 96040 ZZH GENETIC COUNSELING, EACH 30 MINUTES: CPT | Mod: ZF | Performed by: GENETIC COUNSELOR, MS

## 2020-02-11 NOTE — PROGRESS NOTES
[facility]  Genetic Counseling Consult    Patient:  Brandie Pepper YOB: 1985   Date of Service:  20      Brandie Pepper was seen at the [facility] for genetic consultation as part of her appointment for comprehensive ultrasound.  The indication for genetic counseling is advanced maternal age. The patient was unaccompanied to today's visit.        Impression/Plan:   1. Brandie has not had serum screening in this pregnancy.  At this time the patient declines genetic amniocentesis and maternal serum screening. She is aware that these options will remain available to her throughout the pregnancy and was provided with my contact information if she has any additional questions.     2. Brandie had a comprehensive (level II) ultrasound today.  Please see the ultrasound report for further details.    Pregnancy History:   /Parity:    Age at Delivery: 35 year old  ARIE: 2020, by Ultrasound  Gestational Age: 18w5d    No significant complications or exposures were reported in the current pregnancy.    Brandie cross pregnancy history is significant for one SAB and three term deliveries.    Medical History:   Brandie has a history of anxiety, which is managed with Zoloft during pregnancy. She also reports a history of a heart murmur. She believes she may have had a cardiac evaluation for her heart murmur when she was younger.        Family History:   A three-generation pedigree was obtained, and is scanned under the  Media  tab.   The following significant findings were reported by Brandie:    Brandie's partner, Nik is 36 and healthy.     The couple's daughter was reported to have dysphagia as an infant requiring medical care. The couple's first son was reported to have some subtle delays in development, however has not been given any specific diagnosis. This son also has a history of FPIES. We discussed how with  out a specific diagnosis, risk assessment is challenging. There may be an increased chance for the couple's other children to have similar concerns given that these are first degree relatives. Brandie was encouraged to share this information with the pediatrician and watch for early signs and symptoms.     It was reported that Nik's paternal male first cousin was found to have hydrocephalus and passed away at age 5. No other family history of hydrocephalus was reported. We discussed that hydrocephalus can occur as an isolated finding or part of a broader underlying etiology such as a genetic condition, central nervous system malformation, neural tube defect, infection, tumor, etc. We discussed how the most common genetic form of congenital hydrocephalus is X-linked hydrocephalus due to aqueductal stenosis and given the nature of X-linked inheritance and the reported family history, we would not expect the risk to be increased. However, without further information regarding the affected individual, risk assessment is challenging.    It was reported that Brandie's brother is followed by cardiology for a possible arrhythmia. Further details are not known. No other family history of cardiac concerns were reported. We discussed how some cardiomyopathies and arrhythmias can be due to an underlying genetic cause that can be passed through families. With out further details, risk assessment is challenging. Brandie was encouraged to share this information with her primary care provider in order to ensure appropriate screening and she can follow up with any new family history information.    Kandis maternal aunt was reported to have a history of many miscarriages. No further details are known. No other family history of multiple miscarriages was reported. There can be many causes for multiple pregnancy losses and risk assessment is challenging in the absence of an underlying etiology.     Brandie's paternal  aunt and uncle were reported to have some type of autoimmune condition. Further details are not known. We discussed how autoimmune conditions are thought to be multifactorial, resulting from both genetic and non-genetic factors. Once an autoimmune disease is present in a family, other close relatives may be at increased risk.    Otherwise, the reported family history is negative for multiple miscarriages, stillbirths, birth defects, intellectual disability, known genetic conditions, and consanguinity.       Carrier Screening:   The patient reports that she and the father of the pregnancy have  ancestry:     Cystic fibrosis is an autosomal recessive genetic condition that occurs with increased frequency in individuals of  ancestry and carrier screening for this condition is available.  In addition,  screening in the [facility] includes cystic fibrosis.      Expanded carrier screening for mutations in a large panel of genes associated with autosomal recessive conditions including cystic fibrosis, spinal muscular atrophy, and others, is now available.      The patient has declined the carrier screening options reviewed today.       Risk Assessment for Chromosome Conditions:   We explained that the risk for fetal chromosome abnormalities increases with maternal age. We discussed specific features of common chromosome abnormalities, including Down syndrome, trisomy 13, trisomy 18, and sex chromosome trisomies.      - At age 35 at midtrimester, the risk to have a baby with Down syndrome is 1 in 168.     - At age 35 at midtrimester, the risk to have a baby with any chromosome abnormality is 1 in 82.       Brandie did not have maternal serum screening earlier in pregnancy.       Testing Options:   We discussed the following options:   Non-invasive Prenatal Testing (NIPT)    Maternal plasma cell-free DNA testing; first trimester ultrasound with nuchal translucency and nasal bone assessment is  recommended, when appropriate    Screens for fetal trisomy 21, trisomy 13, trisomy 18, and sex chromosome aneuploidy    Cannot screen for open neural tube defects; maternal serum AFP after 15 weeks is recommended     Quad screen:     Maternal plasma AFP, hCG, estriol, and inhibin measurement between 15-24 weeks gestation    Provides risk assessment for fetal Down syndrome, trisomy 18, and neural tube defects     Genetic Amniocentesis    Invasive procedure typically performed in the second trimester by which amniotic fluid is obtained for the purpose of chromosome analysis and/or other prenatal genetic analysis    Diagnostic results; >99% sensitivity for fetal chromosome abnormalities    AFAFP measurement tests for open neural tube defects     Comprehensive (Level II) ultrasound: Detailed ultrasound performed between 18-22 weeks gestation to screen for major birth defects and markers for aneuploidy.      We reviewed the benefits and limitations of this testing.  Screening tests provide a risk assessment specific to the pregnancy for certain fetal chromosome abnormalities, but cannot definitively diagnose or exclude a fetal chromosome abnormality.  Follow-up genetic counseling and consideration of diagnostic testing is recommended with any abnormal screening result.     Diagnostic tests carry inherent risks- including risk of miscarriage- that require careful consideration.  These tests can detect fetal chromosome abnormalities with greater than 99% certainty.  Results can be compromised by maternal cell contamination or mosaicism, and are limited by the resolution of cytogenetic G-banding technology.  There is no screening nor diagnostic test that can detect all forms of birth defects or mental disability.    It was a pleasure to be involved with Brandie cross [facility]. Face-to-face time of the meeting was 40 minutes.    Do Crane MS, [facility]  Maternal Fetal Medicine  [facility]  Ph:  [phone]  [email or web address]

## 2020-02-11 NOTE — PROGRESS NOTES
"Please see \"Imaging\" tab under Chart Review for full details.    Karen Howe MD  Maternal Fetal Medicine    "

## 2020-04-22 LAB — T PALLIDUM IGG SER QL: NORMAL

## 2020-06-12 LAB — GROUP B STREP PCR: NORMAL

## 2020-06-17 ENCOUNTER — HOSPITAL ENCOUNTER (OUTPATIENT)
Facility: CLINIC | Age: 35
Discharge: HOME | End: 2020-06-17
Attending: OBSTETRICS & GYNECOLOGY | Admitting: OBSTETRICS & GYNECOLOGY
Payer: COMMERCIAL

## 2020-06-17 VITALS — RESPIRATION RATE: 16 BRPM | DIASTOLIC BLOOD PRESSURE: 60 MMHG | TEMPERATURE: 99.3 F | SYSTOLIC BLOOD PRESSURE: 116 MMHG

## 2020-06-17 PROCEDURE — 59025 FETAL NON-STRESS TEST: CPT

## 2020-06-17 PROCEDURE — G0463 HOSPITAL OUTPT CLINIC VISIT: HCPCS | Mod: 25

## 2020-06-17 RX ORDER — ONDANSETRON 2 MG/ML
4 INJECTION INTRAMUSCULAR; INTRAVENOUS EVERY 6 HOURS PRN
Status: DISCONTINUED | OUTPATIENT
Start: 2020-06-17 | End: 2020-06-18 | Stop reason: HOSPADM

## 2020-06-18 NOTE — PLAN OF CARE
"Data: [facility] at .   Reason for maternal/fetal assessment per patient is Decreased Fetal Movement  . Patient is a . Prenatal record reviewed.    Gestational Age 36w6d. VSS. Cervix: not examined.  Fetal movement present. Patient denies cramping, backache, vaginal discharge, pelvic pressure, UTI symptoms, GI problems, bloody show, vaginal bleeding, edema, headache, visual disturbances, epigastric or URQ pain, abdominal pain, rupture of membranes. Pt realized today that she has not felt the baby move very much this afternoon/evening. Pt reports attempting to drink lemonade PTA, \"pressing\" on abdomen (which usually causes movement), and also laid down to focus on movement. Support persons not present, he is at home with other children.  Action: Verbal consent for EFM. Triage assessment completed. EFM applied for fetal assessment. Uterine assessment shows occasional contractions, pt reports tightening with most and slight \"twinge\" with some. But also states this has been very common for her over last 3 weeks. Fetal assessment: Presumed adequate fetal oxygenation documented (see flow record). Patient instructed to report change in fetal movement, vaginal leaking of fluid or bleeding, abdominal pain, or any concerns related to the pregnancy to her nurse/physician. Longer monitoring needed due to increase in fetal movement after patient arrival with increased baseline (see flow record). Provider updated at  regarding difficulty tracing active infant, as well as increased baseline with increased activity. Patient states infant is typically \"more active at this time of night anyway\". Per provider to continuously monitor until increased activity subsides and then obtain NST. Will continue to monitor.  Response: Dr. Grant informed at 2300 of NST obtained after increase in movement has subsided to infant's usual movement. Plan per provider is discharge patient home with instructions " to continue monitoring infant movement and call nurse line if noticed to be decreased again. Patient to also follow up as scheduled at appointment tomorrow (6/18/2020). Patient verbalized understanding of education and verbalized agreement with plan. Discharged ambulatory at 2315.

## 2020-06-18 NOTE — PROVIDER NOTIFICATION
06/17/20 2100   Uterine Activity Assessment   Method external tocotransducer   Contraction Frequency (Minutes) 10 w/ irritability between   Contraction Duration (seconds) 110   Contraction Intensity mild by palpation   Uterine Resting Tone soft by palpation   Fetal Assessment   Fetal Movement movement increased  (heard audibly, rhythmic like hiccups)   Fetal HR Assessment Method external US   Fetal HR (beats/min) 170   Fetal Heart Baseline Rate tachycardia   Fetal HR Variability moderate (amplitude range 6 to 25 bpm)   Fetal HR Accelerations present   Fetal HR Decelerations variable   Discussed with provider the FHR tracing seen (and charted) at 2041 and the apparent incrased baseline seen and charted at 2100. Discussed overall increase in fetal movement at 2043 with rhythmic movement sounding like hiccups. Continued large audible movements heard until this time, per provider to continue monitoring until larger movements level out and see what FHR does also.

## 2020-06-18 NOTE — DISCHARGE INSTRUCTIONS
Discharge Instruction for Undelivered Patients      You were seen for: Fetal Assessment  We Consulted: Dr Grant  You had (Test or Medicine):Fetal Non Stress Test     Diet:   Drink 8 to 12 glasses of liquids (milk, juice, water) every day.  You may eat meals and snacks.     Activity:  Count fetal kicks everyday (see handout)  Call your doctor or nurse midwife if your baby is moving less than usual.     Call your provider if you notice:  Swelling in your face or increased swelling in your hands or legs.  Headaches that are not relieved by Tylenol (acetaminophen).  Changes in your vision (blurring: seeing spots or stars.)  Nausea (sick to your stomach) and vomiting (throwing up).   Weight gain of 5 pounds or more per week.  Heartburn that doesn't go away.  Signs of bladder infection: pain when you urinate (use the toilet), need to go more often and more urgently.  The bag of martinez (rupture of membranes) breaks, or you notice leaking in your underwear.  Bright red blood in your underwear.  Abdominal (lower belly) or stomach pain.    Second (plus) baby: Contractions (tightening) less than 10 minutes apart and getting stronger.      Follow-up:  As scheduled in the clinic tomorrow (6/18/2020)

## 2020-06-21 ENCOUNTER — HOSPITAL ENCOUNTER (OUTPATIENT)
Facility: CLINIC | Age: 35
Discharge: HOME | End: 2020-06-22
Attending: OBSTETRICS & GYNECOLOGY | Admitting: OBSTETRICS & GYNECOLOGY
Payer: COMMERCIAL

## 2020-06-21 VITALS — SYSTOLIC BLOOD PRESSURE: 122 MMHG | DIASTOLIC BLOOD PRESSURE: 70 MMHG | TEMPERATURE: 98.2 F | RESPIRATION RATE: 18 BRPM

## 2020-06-21 PROBLEM — O26.90 PREGNANCY RELATED CONDITION: Status: ACTIVE | Noted: 2017-09-18

## 2020-06-21 PROCEDURE — 59025 FETAL NON-STRESS TEST: CPT

## 2020-06-21 PROCEDURE — G0463 HOSPITAL OUTPT CLINIC VISIT: HCPCS | Mod: 25

## 2020-06-21 RX ORDER — ONDANSETRON 2 MG/ML
4 INJECTION INTRAMUSCULAR; INTRAVENOUS EVERY 6 HOURS PRN
Status: DISCONTINUED | OUTPATIENT
Start: 2020-06-21 | End: 2020-06-22 | Stop reason: HOSPADM

## 2020-06-22 PROCEDURE — 59025 FETAL NON-STRESS TEST: CPT

## 2020-06-22 NOTE — DISCHARGE INSTRUCTIONS
Discharge Instruction for Undelivered Patients      You were seen for: Labor Assessment  We Consulted: Dr. Sousa  You had (Test or Medicine):NST     Diet:   Drink 8 to 12 glasses of liquids (milk, juice, water) every day.  You may eat meals and snacks.     Activity:  Count fetal kicks everyday (see handout)  Call your doctor or nurse midwife if your baby is moving less than usual.     Call your provider if you notice:  Swelling in your face or increased swelling in your hands or legs.  Headaches that are not relieved by Tylenol (acetaminophen).  Changes in your vision (blurring: seeing spots or stars.)  Nausea (sick to your stomach) and vomiting (throwing up).   Weight gain of 5 pounds or more per week.  Heartburn that doesn't go away.  Signs of bladder infection: pain when you urinate (use the toilet), need to go more often and more urgently.  The bag of martinez (rupture of membranes) breaks, or you notice leaking in your underwear.  Bright red blood in your underwear.  Abdominal (lower belly) or stomach pain.  For first baby: Contractions (tightening) less than 5 minutes apart for one hour or more.  Second (plus) baby: Contractions (tightening) less than 10 minutes apart and getting stronger.  *If less than 34 weeks: Contractions (tightenings) more than 6 times in one hour.  Increase or change in vaginal discharge (note the color and amount)  Other:     Follow-up:  {OB DC INST NOT ADMITTED OTHER:[id number]}

## 2020-06-22 NOTE — PROGRESS NOTES
Pt ambulates to MAC with complaints of irregular contractions this evening that got closer together around 2100.  Pt rates the ctx cramping at 2/10.  Pt denies any bleeding or LOF.  + FM.  POC discussed, verbal consent obtained.    EFM applied.     SVE 1/10-20/-3    RN encouraging PO Hydration.     SVE unchanged    Dr. Sousa updated, ok to discharge home.    Discharge instructions and return precautions reviewed with patient. All questions answered and pt verbalizes understanding.  Pt will schedule next appointment in clinic.  Pt seen ambulating off unit in stable condition with all belongings at 0050.

## 2020-07-02 ENCOUNTER — HOSPITAL ENCOUNTER (INPATIENT)
Facility: CLINIC | Age: 35
LOS: 2 days | Discharge: HOME HEALTH | End: 2020-07-04
Attending: OBSTETRICS & GYNECOLOGY | Admitting: OBSTETRICS & GYNECOLOGY
Payer: COMMERCIAL

## 2020-07-02 ENCOUNTER — HOSPITAL ENCOUNTER (OUTPATIENT)
Facility: CLINIC | Age: 35
Discharge: HOME | End: 2020-07-02
Attending: OBSTETRICS & GYNECOLOGY | Admitting: OBSTETRICS & GYNECOLOGY
Payer: COMMERCIAL

## 2020-07-02 ENCOUNTER — ANESTHESIA (OUTPATIENT)
Dept: OBGYN | Facility: CLINIC | Age: 35
End: 2020-07-02
Payer: COMMERCIAL

## 2020-07-02 ENCOUNTER — ANESTHESIA EVENT (OUTPATIENT)
Dept: OBGYN | Facility: CLINIC | Age: 35
End: 2020-07-02
Payer: COMMERCIAL

## 2020-07-02 VITALS
WEIGHT: 185 LBS | DIASTOLIC BLOOD PRESSURE: 62 MMHG | HEIGHT: 63 IN | RESPIRATION RATE: 16 BRPM | BODY MASS INDEX: 32.78 KG/M2 | SYSTOLIC BLOOD PRESSURE: 124 MMHG | TEMPERATURE: 99 F

## 2020-07-02 DIAGNOSIS — O99.340 DEPRESSION DURING PREGNANCY, ANTEPARTUM: ICD-10-CM

## 2020-07-02 DIAGNOSIS — F32.A DEPRESSION DURING PREGNANCY, ANTEPARTUM: ICD-10-CM

## 2020-07-02 LAB
ABO + RH BLD: NORMAL
ABO + RH BLD: NORMAL
RUPTURE OF FETAL MEMBRANES BY ROM PLUS: NEGATIVE
SPECIMEN EXP DATE BLD: NORMAL

## 2020-07-02 PROCEDURE — 37000011 ZZH ANESTHESIA WARD SERVICE

## 2020-07-02 PROCEDURE — G0463 HOSPITAL OUTPT CLINIC VISIT: HCPCS | Mod: 25

## 2020-07-02 PROCEDURE — 72200001 ZZH LABOR CARE VAGINAL DELIVERY SINGLE

## 2020-07-02 PROCEDURE — 86780 TREPONEMA PALLIDUM: CPT | Performed by: OBSTETRICS & GYNECOLOGY

## 2020-07-02 PROCEDURE — 25800030 ZZH RX IP 258 OP 636: Performed by: OBSTETRICS & GYNECOLOGY

## 2020-07-02 PROCEDURE — 36415 COLL VENOUS BLD VENIPUNCTURE: CPT | Performed by: OBSTETRICS & GYNECOLOGY

## 2020-07-02 PROCEDURE — 25000128 H RX IP 250 OP 636: Performed by: ANESTHESIOLOGY

## 2020-07-02 PROCEDURE — 12000000 ZZH R&B MED SURG/OB

## 2020-07-02 PROCEDURE — 59025 FETAL NON-STRESS TEST: CPT

## 2020-07-02 PROCEDURE — 25000125 ZZHC RX 250: Performed by: OBSTETRICS & GYNECOLOGY

## 2020-07-02 PROCEDURE — 86900 BLOOD TYPING SEROLOGIC ABO: CPT | Performed by: OBSTETRICS & GYNECOLOGY

## 2020-07-02 PROCEDURE — 84112 EVAL AMNIOTIC FLUID PROTEIN: CPT | Performed by: OBSTETRICS & GYNECOLOGY

## 2020-07-02 PROCEDURE — 86901 BLOOD TYPING SEROLOGIC RH(D): CPT | Performed by: OBSTETRICS & GYNECOLOGY

## 2020-07-02 RX ORDER — IBUPROFEN 400 MG/1
800 TABLET, FILM COATED ORAL
Status: DISCONTINUED | OUTPATIENT
Start: 2020-07-02 | End: 2020-07-03

## 2020-07-02 RX ORDER — TRANEXAMIC ACID 10 MG/ML
1 INJECTION, SOLUTION INTRAVENOUS EVERY 30 MIN PRN
Status: DISCONTINUED | OUTPATIENT
Start: 2020-07-02 | End: 2020-07-03

## 2020-07-02 RX ORDER — BUPIVACAINE HYDROCHLORIDE 2.5 MG/ML
1.5 INJECTION, SOLUTION EPIDURAL; INFILTRATION; INTRACAUDAL ONCE
Status: DISCONTINUED | OUTPATIENT
Start: 2020-07-02 | End: 2020-07-03

## 2020-07-02 RX ORDER — EPHEDRINE SULFATE 50 MG/ML
5 INJECTION, SOLUTION INTRAMUSCULAR; INTRAVENOUS; SUBCUTANEOUS
Status: DISCONTINUED | OUTPATIENT
Start: 2020-07-02 | End: 2020-07-03

## 2020-07-02 RX ORDER — OXYCODONE AND ACETAMINOPHEN 5; 325 MG/1; MG/1
1 TABLET ORAL
Status: DISCONTINUED | OUTPATIENT
Start: 2020-07-02 | End: 2020-07-03

## 2020-07-02 RX ORDER — NALOXONE HYDROCHLORIDE 0.4 MG/ML
.1-.4 INJECTION, SOLUTION INTRAMUSCULAR; INTRAVENOUS; SUBCUTANEOUS
Status: DISCONTINUED | OUTPATIENT
Start: 2020-07-02 | End: 2020-07-03

## 2020-07-02 RX ORDER — ONDANSETRON 2 MG/ML
4 INJECTION INTRAMUSCULAR; INTRAVENOUS EVERY 6 HOURS PRN
Status: DISCONTINUED | OUTPATIENT
Start: 2020-07-02 | End: 2020-07-03

## 2020-07-02 RX ORDER — FENTANYL CITRATE 50 UG/ML
25 INJECTION, SOLUTION INTRAMUSCULAR; INTRAVENOUS ONCE
Status: COMPLETED | OUTPATIENT
Start: 2020-07-02 | End: 2020-07-02

## 2020-07-02 RX ORDER — NALBUPHINE HYDROCHLORIDE 10 MG/ML
2.5-5 INJECTION, SOLUTION INTRAMUSCULAR; INTRAVENOUS; SUBCUTANEOUS EVERY 6 HOURS PRN
Status: DISCONTINUED | OUTPATIENT
Start: 2020-07-02 | End: 2020-07-03

## 2020-07-02 RX ORDER — ONDANSETRON 4 MG/1
4 TABLET, ORALLY DISINTEGRATING ORAL EVERY 6 HOURS PRN
Status: DISCONTINUED | OUTPATIENT
Start: 2020-07-02 | End: 2020-07-03

## 2020-07-02 RX ORDER — ACETAMINOPHEN 325 MG/1
650 TABLET ORAL EVERY 4 HOURS PRN
Status: DISCONTINUED | OUTPATIENT
Start: 2020-07-02 | End: 2020-07-03

## 2020-07-02 RX ORDER — SODIUM CHLORIDE, SODIUM LACTATE, POTASSIUM CHLORIDE, CALCIUM CHLORIDE 600; 310; 30; 20 MG/100ML; MG/100ML; MG/100ML; MG/100ML
INJECTION, SOLUTION INTRAVENOUS CONTINUOUS
Status: DISCONTINUED | OUTPATIENT
Start: 2020-07-02 | End: 2020-07-03

## 2020-07-02 RX ORDER — OXYTOCIN/0.9 % SODIUM CHLORIDE 30/500 ML
100-340 PLASTIC BAG, INJECTION (ML) INTRAVENOUS CONTINUOUS PRN
Status: COMPLETED | OUTPATIENT
Start: 2020-07-02 | End: 2020-07-02

## 2020-07-02 RX ORDER — BUPIVACAINE HYDROCHLORIDE 2.5 MG/ML
INJECTION, SOLUTION EPIDURAL; INFILTRATION; INTRACAUDAL PRN
Status: DISCONTINUED | OUTPATIENT
Start: 2020-07-02 | End: 2020-07-04 | Stop reason: HOSPADM

## 2020-07-02 RX ORDER — CARBOPROST TROMETHAMINE 250 UG/ML
250 INJECTION, SOLUTION INTRAMUSCULAR
Status: DISCONTINUED | OUTPATIENT
Start: 2020-07-02 | End: 2020-07-03

## 2020-07-02 RX ORDER — METHYLERGONOVINE MALEATE 0.2 MG/ML
200 INJECTION INTRAVENOUS
Status: DISCONTINUED | OUTPATIENT
Start: 2020-07-02 | End: 2020-07-03

## 2020-07-02 RX ORDER — OXYTOCIN 10 [USP'U]/ML
10 INJECTION, SOLUTION INTRAMUSCULAR; INTRAVENOUS
Status: DISCONTINUED | OUTPATIENT
Start: 2020-07-02 | End: 2020-07-03

## 2020-07-02 RX ORDER — ONDANSETRON 2 MG/ML
4 INJECTION INTRAMUSCULAR; INTRAVENOUS EVERY 6 HOURS PRN
Status: DISCONTINUED | OUTPATIENT
Start: 2020-07-02 | End: 2020-07-02 | Stop reason: HOSPADM

## 2020-07-02 RX ADMIN — LIDOCAINE HYDROCHLORIDE 20 ML: 10 INJECTION, SOLUTION INFILTRATION; PERINEURAL at 23:26

## 2020-07-02 RX ADMIN — SODIUM CHLORIDE, POTASSIUM CHLORIDE, SODIUM LACTATE AND CALCIUM CHLORIDE 500 ML: 600; 310; 30; 20 INJECTION, SOLUTION INTRAVENOUS at 22:26

## 2020-07-02 RX ADMIN — SODIUM CHLORIDE, POTASSIUM CHLORIDE, SODIUM LACTATE AND CALCIUM CHLORIDE: 600; 310; 30; 20 INJECTION, SOLUTION INTRAVENOUS at 22:51

## 2020-07-02 RX ADMIN — FENTANYL CITRATE 25 MCG: 50 INJECTION, SOLUTION INTRAMUSCULAR; INTRAVENOUS at 22:46

## 2020-07-02 RX ADMIN — BUPIVACAINE HYDROCHLORIDE 1.5 ML: 2.5 INJECTION, SOLUTION EPIDURAL; INFILTRATION; INTRACAUDAL at 22:46

## 2020-07-02 RX ADMIN — Medication 340 ML/HR: at 23:25

## 2020-07-02 ASSESSMENT — ENCOUNTER SYMPTOMS: SEIZURES: 0

## 2020-07-02 ASSESSMENT — MIFFLIN-ST. JEOR: SCORE: 1493.28

## 2020-07-02 NOTE — PLAN OF CARE
Patient back on monitor. Slight change in SVE, however patient feels that her contractions have become less painful and farther apart.

## 2020-07-02 NOTE — PROVIDER NOTIFICATION
07/02/20 1400   Fetal Assessment   Fetal HR Assessment Method external US   Fetal HR (beats/min) 140   Fetal Heart Baseline Rate normal range   Fetal HR Variability moderate (amplitude range 6 to 25 bpm)   Fetal HR Accelerations present   Fetal HR Decelerations other (see comments)   1340: loss of capture- FHR found in the 120's right after patient had been on her back for a cervical exam. Back to baseline in ~ one minute.

## 2020-07-02 NOTE — DISCHARGE INSTRUCTIONS
Discharge Instruction for Undelivered Patients      You were seen for: Labor Assessment  We Consulted: Dr. Pastor  You had (Test or Medicine):Fetal and uterine monitoring     Diet:   Drink 8 to 12 glasses of liquids (milk, juice, water) every day.     Activity:  Call your doctor or nurse midwife if your baby is moving less than usual.     Call your provider if you notice:  Swelling in your face or increased swelling in your hands or legs.  Headaches that are not relieved by Tylenol (acetaminophen).  Changes in your vision (blurring: seeing spots or stars.)  Nausea (sick to your stomach) and vomiting (throwing up).   Weight gain of 5 pounds or more per week.  Heartburn that doesn't go away.  Signs of bladder infection: pain when you urinate (use the toilet), need to go more often and more urgently.  The bag of martinez (rupture of membranes) breaks, or you notice leaking in your underwear.  Bright red blood in your underwear.  Abdominal (lower belly) or stomach pain.  Second (plus) baby: Contractions (tightening) less than 10 minutes apart and getting stronger.  Increase or change in vaginal discharge (note the color and amount)      Follow-up:  As scheduled in the clinic

## 2020-07-02 NOTE — PLAN OF CARE
Patient presents to MAC, called in by the nurse advisor line from [facility].     Patient reports having contractions ever 7-8 minutes apart. Patient states that she noticed the contractions after her young child accidentally fell on her belly.     Patient denies any bleeding, has noticed more mucous discharge. Denies any leaking. Reports + fetal movement.    Verbal permission given to apply external monitors. History and vital signs taken.     SVE found to be 2/70/-2. RN noticed a lot of moisture with SVE. ROM + collected and sent.

## 2020-07-02 NOTE — PROVIDER NOTIFICATION
07/02/20 1414   Provider Notification   Provider Name/Title Dawit   Method of Notification Phone   Request Evaluate - Remote   Notification Reason Status Update;SVE;Pain;Uterine Activity;Labor Status;Membrane Status   Telephone call to Dr. Pastor. Updated on SVE, uterine contractions every 3-10 minutes apart, negative ROM+. Telephone orders to let patient bounce on a birthing call for 1 hour and check cervix.

## 2020-07-02 NOTE — PLAN OF CARE
Dr. Mosquera in department. Reviewed fetal heart tracing from [id number]. Updated on SVE, uterine activity and patient's pain level. Verbal orders to discharge patient home. Patient unsure if she wants to go home, not sure what to expect as things progress. Given the option to stay in MAC longer. Labor precautions reviewed extensively. Patient does not live far away. Has decided to go home.       Discharge instructions reviewed. All questions answered. Patient ambulatory home.

## 2020-07-03 PROBLEM — Z34.90 PREGNANCY: Status: ACTIVE | Noted: 2017-09-14

## 2020-07-03 LAB
HGB BLD-MCNC: 11.5 G/DL (ref 11.7–15.7)
SARS-COV-2 PCR COMMENT: NORMAL
SARS-COV-2 RNA SPEC QL NAA+PROBE: NEGATIVE
SARS-COV-2 RNA SPEC QL NAA+PROBE: NORMAL
SPECIMEN SOURCE: NORMAL
SPECIMEN SOURCE: NORMAL
T PALLIDUM AB SER QL: NONREACTIVE

## 2020-07-03 PROCEDURE — 25000132 ZZH RX MED GY IP 250 OP 250 PS 637: Performed by: OBSTETRICS & GYNECOLOGY

## 2020-07-03 PROCEDURE — 85018 HEMOGLOBIN: CPT | Performed by: OBSTETRICS & GYNECOLOGY

## 2020-07-03 PROCEDURE — 0KQM0ZZ REPAIR PERINEUM MUSCLE, OPEN APPROACH: ICD-10-PCS | Performed by: OBSTETRICS & GYNECOLOGY

## 2020-07-03 PROCEDURE — U0003 INFECTIOUS AGENT DETECTION BY NUCLEIC ACID (DNA OR RNA); SEVERE ACUTE RESPIRATORY SYNDROME CORONAVIRUS 2 (SARS-COV-2) (CORONAVIRUS DISEASE [COVID-19]), AMPLIFIED PROBE TECHNIQUE, MAKING USE OF HIGH THROUGHPUT TECHNOLOGIES AS DESCRIBED BY CMS-2020-01-R: HCPCS | Performed by: OBSTETRICS & GYNECOLOGY

## 2020-07-03 PROCEDURE — 10907ZC DRAINAGE OF AMNIOTIC FLUID, THERAPEUTIC FROM PRODUCTS OF CONCEPTION, VIA NATURAL OR ARTIFICIAL OPENING: ICD-10-PCS | Performed by: OBSTETRICS & GYNECOLOGY

## 2020-07-03 PROCEDURE — 12000035 ZZH R&B POSTPARTUM

## 2020-07-03 PROCEDURE — 36415 COLL VENOUS BLD VENIPUNCTURE: CPT | Performed by: OBSTETRICS & GYNECOLOGY

## 2020-07-03 RX ORDER — TRANEXAMIC ACID 10 MG/ML
1 INJECTION, SOLUTION INTRAVENOUS EVERY 30 MIN PRN
Status: DISCONTINUED | OUTPATIENT
Start: 2020-07-02 | End: 2020-07-04 | Stop reason: HOSPADM

## 2020-07-03 RX ORDER — NALOXONE HYDROCHLORIDE 0.4 MG/ML
.1-.4 INJECTION, SOLUTION INTRAMUSCULAR; INTRAVENOUS; SUBCUTANEOUS
Status: DISCONTINUED | OUTPATIENT
Start: 2020-07-02 | End: 2020-07-04 | Stop reason: HOSPADM

## 2020-07-03 RX ORDER — OXYTOCIN/0.9 % SODIUM CHLORIDE 30/500 ML
100 PLASTIC BAG, INJECTION (ML) INTRAVENOUS CONTINUOUS
Status: DISCONTINUED | OUTPATIENT
Start: 2020-07-03 | End: 2020-07-04 | Stop reason: HOSPADM

## 2020-07-03 RX ORDER — ACETAMINOPHEN 325 MG/1
650 TABLET ORAL EVERY 4 HOURS PRN
Status: DISCONTINUED | OUTPATIENT
Start: 2020-07-02 | End: 2020-07-04 | Stop reason: HOSPADM

## 2020-07-03 RX ORDER — BISACODYL 10 MG
10 SUPPOSITORY, RECTAL RECTAL DAILY PRN
Status: DISCONTINUED | OUTPATIENT
Start: 2020-07-04 | End: 2020-07-04 | Stop reason: HOSPADM

## 2020-07-03 RX ORDER — HYDROCORTISONE 2.5 %
CREAM (GRAM) TOPICAL 3 TIMES DAILY PRN
Status: DISCONTINUED | OUTPATIENT
Start: 2020-07-02 | End: 2020-07-04 | Stop reason: HOSPADM

## 2020-07-03 RX ORDER — IBUPROFEN 600 MG/1
600 TABLET, FILM COATED ORAL EVERY 6 HOURS PRN
Qty: 90 TABLET | Refills: 0 | Status: SHIPPED | OUTPATIENT
Start: 2020-07-03

## 2020-07-03 RX ORDER — OXYCODONE HYDROCHLORIDE 5 MG/1
5 TABLET ORAL EVERY 4 HOURS PRN
Status: DISCONTINUED | OUTPATIENT
Start: 2020-07-03 | End: 2020-07-04 | Stop reason: HOSPADM

## 2020-07-03 RX ORDER — POLYETHYLENE GLYCOL 3350 17 G/17G
17 POWDER, FOR SOLUTION ORAL DAILY
Status: DISCONTINUED | OUTPATIENT
Start: 2020-07-03 | End: 2020-07-04 | Stop reason: HOSPADM

## 2020-07-03 RX ORDER — SERTRALINE HYDROCHLORIDE 25 MG/1
25 TABLET, FILM COATED ORAL ONCE
Status: COMPLETED | OUTPATIENT
Start: 2020-07-03 | End: 2020-07-03

## 2020-07-03 RX ORDER — SERTRALINE HYDROCHLORIDE 25 MG/1
25 TABLET, FILM COATED ORAL DAILY
Status: DISCONTINUED | OUTPATIENT
Start: 2020-07-03 | End: 2020-07-03

## 2020-07-03 RX ORDER — OXYTOCIN 10 [USP'U]/ML
10 INJECTION, SOLUTION INTRAMUSCULAR; INTRAVENOUS
Status: DISCONTINUED | OUTPATIENT
Start: 2020-07-02 | End: 2020-07-04 | Stop reason: HOSPADM

## 2020-07-03 RX ORDER — OXYTOCIN/0.9 % SODIUM CHLORIDE 30/500 ML
340 PLASTIC BAG, INJECTION (ML) INTRAVENOUS CONTINUOUS PRN
Status: DISCONTINUED | OUTPATIENT
Start: 2020-07-02 | End: 2020-07-04 | Stop reason: HOSPADM

## 2020-07-03 RX ORDER — IBUPROFEN 400 MG/1
800 TABLET, FILM COATED ORAL EVERY 6 HOURS PRN
Status: DISCONTINUED | OUTPATIENT
Start: 2020-07-02 | End: 2020-07-04 | Stop reason: HOSPADM

## 2020-07-03 RX ORDER — MODIFIED LANOLIN
OINTMENT (GRAM) TOPICAL
Status: DISCONTINUED | OUTPATIENT
Start: 2020-07-02 | End: 2020-07-04 | Stop reason: HOSPADM

## 2020-07-03 RX ORDER — ACETAMINOPHEN 325 MG/1
650 TABLET ORAL EVERY 4 HOURS PRN
Qty: 90 TABLET | Refills: 0 | Status: SHIPPED | OUTPATIENT
Start: 2020-07-03

## 2020-07-03 RX ORDER — AMOXICILLIN 250 MG
2 CAPSULE ORAL 2 TIMES DAILY
Status: DISCONTINUED | OUTPATIENT
Start: 2020-07-03 | End: 2020-07-04 | Stop reason: HOSPADM

## 2020-07-03 RX ORDER — AMOXICILLIN 250 MG
1 CAPSULE ORAL 2 TIMES DAILY
Status: DISCONTINUED | OUTPATIENT
Start: 2020-07-03 | End: 2020-07-04 | Stop reason: HOSPADM

## 2020-07-03 RX ADMIN — POLYETHYLENE GLYCOL 3350 17 G: 17 POWDER, FOR SOLUTION ORAL at 10:28

## 2020-07-03 RX ADMIN — SERTRALINE HYDROCHLORIDE 25 MG: 25 TABLET ORAL at 19:55

## 2020-07-03 RX ADMIN — IBUPROFEN 800 MG: 400 TABLET, FILM COATED ORAL at 01:07

## 2020-07-03 RX ADMIN — ACETAMINOPHEN 650 MG: 325 TABLET, FILM COATED ORAL at 13:50

## 2020-07-03 RX ADMIN — IBUPROFEN 800 MG: 400 TABLET, FILM COATED ORAL at 06:53

## 2020-07-03 RX ADMIN — IBUPROFEN 800 MG: 400 TABLET, FILM COATED ORAL at 19:55

## 2020-07-03 RX ADMIN — IBUPROFEN 800 MG: 400 TABLET, FILM COATED ORAL at 13:50

## 2020-07-03 RX ADMIN — DOCUSATE SODIUM AND SENNOSIDES 1 TABLET: 8.6; 5 TABLET, FILM COATED ORAL at 08:13

## 2020-07-03 RX ADMIN — ACETAMINOPHEN 650 MG: 325 TABLET, FILM COATED ORAL at 19:54

## 2020-07-03 RX ADMIN — SERTRALINE HYDROCHLORIDE 25 MG: 25 TABLET ORAL at 08:13

## 2020-07-03 RX ADMIN — DOCUSATE SODIUM AND SENNOSIDES 1 TABLET: 8.6; 5 TABLET, FILM COATED ORAL at 19:55

## 2020-07-03 RX ADMIN — ACETAMINOPHEN 650 MG: 325 TABLET, FILM COATED ORAL at 05:08

## 2020-07-03 RX ADMIN — ACETAMINOPHEN 650 MG: 325 TABLET, FILM COATED ORAL at 01:07

## 2020-07-03 NOTE — H&P
Brandie Jose  [id number]  OB Admit History & Physical      HPI:  Ms. Jose  is a 35 year old  @ 39w1d by LMP who presented to L&D for regular contractions.      Prenatal course:  1st visit at 10 weeks, regular care, TWG 39#.    Pregnancy complications:      Prenatal labs:  O negatie, antibody screen negative,  Rubella ** Immune, Hep B/HIV/RPR all negative, GC/CT negative, ,  GBS negatie; genetic screening tests declined     OB history:   OB History    Para Term  AB Living   5 3 3 0 1 3   SAB TAB Ectopic Multiple Live Births   0 0 0 0 3      # Outcome Date GA Lbr Mannie/2nd Weight Sex Delivery Anes PTL Lv   5 Current            4 Term 18/ 40w1d 01:00 / 01:21 3.65 kg (8 lb 0.8 oz) M Vag-Spont EPI N DASHAWN      Name: HATTIE JOSE      Apgar1: 8  Apgar5: 9   3 Term      Vag-Spont      2 Term      Vag-Vacuum      1 AB                  PMHx:     Past Medical History:   Diagnosis Date     Anxiety      Endometriosis      Kidney stone        PSHX:    Past Surgical History:   Procedure Laterality Date     D & C       LAPAROSCOPIC ABLATION ENDOMETRIOSIS         Meds:    No current outpatient medications on file.       Allergies: Patient has no known allergies.      REVIEW OF SYSTEMS:  Positives and negatives in HPI.     SocHx:    Social History     Socioeconomic History     Marital status:      Spouse name: Not on file     Number of children: Not on file     Years of education: Not on file     Highest education level: Not on file   Occupational History     Not on file   Social Needs     Financial resource strain: Not on file     Food insecurity     Worry: Not on file     Inability: Not on file     Transportation needs     Medical: Not on file     Non-medical: Not on file   Tobacco Use     Smoking status: Never Smoker     Smokeless tobacco: Never Used   Substance and Sexual Activity     Alcohol use: No     Alcohol/week: 0.0 standard drinks     Drug use: No     Sexual activity: Yes      Partners: Male   Lifestyle     Physical activity     Days per week: Not on file     Minutes per session: Not on file     Stress: Not on file   Relationships     Social connections     Talks on phone: Not on file     Gets together: Not on file     Attends Religion service: Not on file     Active member of club or organization: Not on file     Attends meetings of clubs or organizations: Not on file     Relationship status: Not on file     Intimate partner violence     Fear of current or ex partner: Not on file     Emotionally abused: Not on file     Physically abused: Not on file     Forced sexual activity: Not on file   Other Topics Concern     Parent/sibling w/ CABG, MI or angioplasty before 65F 55M? Not Asked   Social History Narrative     Not on file        Fam Hx:    Family History   Problem Relation Age of Onset     Cancer Paternal Grandfather         skin cancer     Cancer Other         Maternal Aunt SKin cancer         PHYSICAL EXAM:      Vitals:  /60   Temp 97.8  F (36.6  C) (Temporal)   Resp 20   LMP 2019   Alert Awake in NAD  ABD gravid, non-tender, EFW 7 1/2#  Cervix:  8 cm / 90 % effaced at -1 station, membranes AROM clear  EFM:  Baseline 140, with moderate variability, accels to present, variable decels  [place]: contractions q2 min    Assessment:  IUP at 39w1d admitted for active labor. ItN for pain    Plan:  Admission            Continuous fetal and uterine monitoring            Analgesia-ITN            The plan of care was discussed with the patient and her partner.  They expressed understanding and agreement.             Anticipate TRUMAN Wayne MD   Dept of OB/GYN  July 3, 2020

## 2020-07-03 NOTE — PLAN OF CARE
Pt arrived to the unit around 0215. Vitally stable. Fundus firm, midline and U/U . Scant rubra lochia.  Pain well controlled with tylenol and ibuprofen. Voided x2 post-delivery. Breastfeeding every 2-3 hours. Up independently and making needs known. Orientation to unit and plan of care provided upon arrival to the unit. Continue with POC.

## 2020-07-03 NOTE — LACTATION NOTE
Initial Lactation visit with Brandie & baby girl. Brandie reports feeding is going well so far. She shared that breastfeeding went well with her children, especially her last one, who  for 18 months.    Discussed cluster feeding, what it is and when to expect it, The Second Night, satiety cues, feeding cues, and reviewed Feeding Log for home use.    Recommend unlimited, frequent breast feedings: At least 8 - 12 times every 24 hours. Recommended rooming in. Instructed in hand expression. Avoid pacifiers and supplementation with formula unless medically indicated. Explained benefits of holding baby skin on skin to help promote better breastfeeding outcomes. Brandie does not have a pump and is considering getting one prior to discharge. Encouraged getting a new breast pump if insurance covers. Reviewed general recommendation to wait to start pumping to store milk until breastfeeding is well established unless infant needs to supplement for medical reasons or feeding is poor. Brandie appreciative of visit. Will revisit as needed.    Jessica Noel, RN-C, IBCLC, MNN, PHN, BSN

## 2020-07-03 NOTE — PLAN OF CARE
Pt doing well after laboring to complete and delivering baby girl vaginally at 2321 on 07/02/20.  Vital signs, vaginal bleeding, and fundal check WDL.  Infant breastfeeding well.  Pt denies pain or needs at this time.  SO remains at bedside providing emotional support.

## 2020-07-03 NOTE — LACTATION NOTE
Attempted to see Brandie for initial visit. Infant sleeping. Brandie going to sleep now. Requesting for day shift to see her instead.  KAZ Pugh RN, BSN, PHN, IBCLC

## 2020-07-03 NOTE — PROGRESS NOTES
Pt ambulates to MAC with complaints of contractions all day that have started to intensify over the last hour and a half, occurring every 5 min.  Pt denies LOF or bleeding,  + FM. POC discussed, verbal consent obtained.  EFM applied    SVE 4/80/-2    Text page to Dr. Wayne with pt arrival update.    Pt becoming increasingly uncomfortable.  Ctx palpating moderate to firm.    SVE 5-6/90/-2    Orders to admit patient for labor and delivery.  Dr. Wayne will come to hospital.    Pt transferred via wheelchair to room 232.  Pt wanting epidural, IV started and fluids running.    Pt moving around for comfort, FHR not adequately tracing.

## 2020-07-03 NOTE — PROGRESS NOTES
Post-partum Note      S: Patient is doing well today.  Pain is controlled with PO medications.  Tolerating regular diet without nausea or vomiting.  Ambulating without dizziness.  Urinating without difficulty. Lochia normal.  Breastfeeding. Requests miralax to avoid constipation.    O:   Patient Vitals for the past 24 hrs:   BP Temp Temp src Pulse Resp SpO2   20 0806 104/67 98  F (36.7  C) Oral 56 16 --   20 0508 110/61 98.3  F (36.8  C) Oral 64 18 --   20 0231 102/58 97.7  F (36.5  C) Oral 62 18 99 %   20 0145 114/55 98.1  F (36.7  C) Temporal -- 18 97 %   20 0130 105/64 -- -- -- -- 98 %   20 0115 110/59 -- -- -- -- 95 %   20 0045 101/61 -- -- -- -- 95 %   20 0030 102/59 -- -- -- 16 94 %   20 0015 101/56 -- -- -- -- 93 %   20 0000 96/58 -- -- -- -- 93 %   20 2345 106/67 -- -- -- -- 95 %   20 2330 97/58 -- -- -- -- 97 %   20 2100 120/60 97.8  F (36.6  C) Temporal -- 20 --     Gen:  Resting comfortably, NAD  Pulm:  Breathing comfortably on room air  Abd:  Soft, appropriately ttp, non-distended.Fundus at umbilicus, firm and non-tender.  Ext:  non-tender, no bilateral LE edema    I/O last 3 completed shifts:  In: -   Out: 350 [Blood:350]    Hgb:   11.5    Assessment/Plan:  35 year old  on PPD #1 s/p .  1. Continue with routine postpartum management  2. Anxiety/depression: Increase home zoloft to 50mg. Usually takes at night and already took 25mg this morning, will give additional 25 mg tonight.  3. No signs or symptoms of acute blood loss anemia. Hgb >10, iron supplementation not indiated.  4. Rh: Negative, baby Rh negative. Rhogam not indicated.  5. Feed: Breastfeeding  Dispo: [place] home tomorow. Warning signs reviewed. Follow-up in 2 weeks for mood check. Prescriptions and discharge orders completed today (including ibuprofen, tylenol, and 50 mg sertraline).    Tiffanie Pastor MD  [facility]  7/3/2020, 8:50 AM

## 2020-07-03 NOTE — ANESTHESIA PREPROCEDURE EVALUATION
"Anesthesia Pre-Procedure Evaluation    Patient: Brandie Pepper   MRN: [id number] : 1985          Preoperative Diagnosis: * No pre-op diagnosis entered *    * No procedures listed *    Past Medical History:   Diagnosis Date     Anxiety      Endometriosis      Kidney stone      Past Surgical History:   Procedure Laterality Date     D & C       LAPAROSCOPIC ABLATION ENDOMETRIOSIS         Anesthesia Evaluation       history and physical reviewed .      No history of anesthetic complications          ROS/MED HX    ENT/Pulmonary:       Neurologic:      (-) seizures   Cardiovascular:        (-) PIH   METS/Exercise Tolerance:     Hematologic:         Musculoskeletal:         GI/Hepatic:     (+) GERD       Renal/Genitourinary:         Endo:         Psychiatric:         Infectious Disease:         Malignancy:         Other:                       (-) no pre-eclampsia and gestational diabetes                 Lab Results   Component Value Date    HGB 11.9 2017       Preop Vitals  BP Readings from Last 3 Encounters:   20 120/60   20 124/62   20 122/70    Pulse Readings from Last 3 Encounters:   17 57      Resp Readings from Last 3 Encounters:   20 20   20 16   20 18    SpO2 Readings from Last 3 Encounters:   17 99%      Temp Readings from Last 1 Encounters:   20 36.6  C (97.8  F) (Temporal)    Ht Readings from Last 1 Encounters:   20 1.6 m (5' 3\")      Wt Readings from Last 1 Encounters:   20 83.9 kg (185 lb)    Estimated body mass index is 32.77 kg/m  as calculated from the following:    Height as of an earlier encounter on 20: 1.6 m (5' 3\").    Weight as of an earlier encounter on 20: 83.9 kg (185 lb).       Anesthesia Plan      History & Physical Review      ASA Status:  2 .  OB Epidural Asa: 2       Plan for Spinal (ITN)            Postoperative Care      Consents  Anesthetic plan, risks, benefits and alternatives discussed with:  " Patient..                 Boaz Wray MD

## 2020-07-03 NOTE — L&D DELIVERY NOTE
OB Vaginal Delivery Note      HPI:  Pt is a 35 year old  @ 39w1d who presented to L&D on 2020 for active labor.            Prenatal labs:  O antibody screen: negative, Rubella immune,  Hep B/HIV/RPR all negative, GC/CT negative, , GBS negative    Pregnancy complications:  AMA anxiety/depression Rh negatie    Hospital Course:    First Stage: On admission, contractions were every 2-4 minutes and patient was 80%/4/-2 dilated. FHTs were in the 140 with accelerations present. moderate variability,  no decelerations noted.   Abdomen was non-tender.  EFW was 7 1/2# by Leopold's.  Patient's labor progressed quikly.    At the patient's request, she received ITN  analgesia.  She did not receive pitocin labor. AROM occurred at 2205 with clear fluid noted.  Patient reached complete cervical dilation at 2307 on 7/3/2020.    Second Stage:  Patient did not  labor down , and began pushing at 2307.  Good maternal expulsive efforts were noted.  Fetal heart tones remained reassuring during the second stage.  Baseline 140, with moderate variability, variable decelerations noted.  She was able to bring the fetal vertex to a full crown.  The fetal vertex was then easily delivered, followed by the fetal shoulders without complications.  A  nuchal cord was not present.  A female infant was then delivered without complications at 2321 on 7/3/2020.  The mouth and nares were bulb suctioned.  The cord was clamped after it had stopped pulsating, cut and the infant was placed on the mother's abdomen.  The infant's weight was pending.  Apgars were 9 and 9 at one and five minutes .       Third Stage:  The placenta then delivered at 2325 .  It was noted to be intact with a three vessel cord.  The patient's perineum was inspected and there was a 2nd degree tear .  The area was infiltrated with 1% lidocaine and repaired in the usual fashion using 3-0 vicryl suture.  EBL for the procedure was 350cc.    Sponge and needle counts were  correct.  The patient and infant remained in the delivery suite following delivery in stable condition.    Alexia Wayne MD  7/3/2020  12:08 AM

## 2020-07-03 NOTE — ANESTHESIA PROCEDURE NOTES
Procedure note : intrathecal  Staff -   Anesthesiologist:  Boaz Wray MD      Performed By: Anesthesiologist        Pre-Procedure  Performed by Boaz Wray MD  Location: OB      Pre-Anesthestic Checklist: patient identified, IV checked, site marked, risks and benefits discussed, informed consent, monitors and equipment checked, pre-op evaluation, at physician/surgeon's request and post-op pain management    Timeout  Correct Patient: Yes   Correct Procedure: Yes   Correct Site: Yes   Correct Laterality: Yes   Correct Position: Yes   Site Marked: Yes   .   Procedure Documentation    .    Procedure: intrathecal, .   Patient Position:sitting Insertion Site:L3-4  (midline approach)     Patient Prep/Sterile Barriers; povidone-iodine 7.5% surgical scrub.  .  Needle:  Spinal Needle (gauge): 25  Spinal/LP Needle Length (inches): 3 # of attempts: 1 and # of redirects:  Introducer used Introducer: 20 G .        Assessment/Narrative  Paresthesias: No.  .  .  clear CSF fluid removed . Time Injected:while sitting   Comments:  Patient sitting on edge of LDR bed, lower back cleaned and prepped in sterile fashion with betadine. 1% lido used to numb area. Introducer placed, spinal needle through introducer. Appropriate flow of CSF and confirmed with aspiration via syringe. Spinal dose given, 1.5cc 0.25% bupivacaine and 25mcg fentanyl. No complications.

## 2020-07-03 NOTE — PROGRESS NOTES
Pt transferred in stable condition via wheelchair holding baby girl Vegeny to room 409.  SO accompanied pt to bedside with all belongings. IV infusing.  Fundal check WDL. Full report given to ARASELI Thomas.

## 2020-07-04 VITALS
HEART RATE: 65 BPM | DIASTOLIC BLOOD PRESSURE: 62 MMHG | TEMPERATURE: 97.9 F | OXYGEN SATURATION: 99 % | SYSTOLIC BLOOD PRESSURE: 102 MMHG | RESPIRATION RATE: 18 BRPM

## 2020-07-04 PROCEDURE — 25000132 ZZH RX MED GY IP 250 OP 250 PS 637: Performed by: OBSTETRICS & GYNECOLOGY

## 2020-07-04 RX ORDER — BREAST PUMP
1 EACH MISCELLANEOUS ONCE
Qty: 1 EACH | Refills: 0 | Status: SHIPPED | OUTPATIENT
Start: 2020-07-04 | End: 2020-07-04

## 2020-07-04 RX ADMIN — IBUPROFEN 800 MG: 400 TABLET, FILM COATED ORAL at 10:01

## 2020-07-04 RX ADMIN — POLYETHYLENE GLYCOL 3350 17 G: 17 POWDER, FOR SOLUTION ORAL at 10:01

## 2020-07-04 RX ADMIN — DOCUSATE SODIUM AND SENNOSIDES 2 TABLET: 8.6; 5 TABLET, FILM COATED ORAL at 10:00

## 2020-07-04 RX ADMIN — IBUPROFEN 800 MG: 400 TABLET, FILM COATED ORAL at 02:04

## 2020-07-04 RX ADMIN — ACETAMINOPHEN 650 MG: 325 TABLET, FILM COATED ORAL at 02:04

## 2020-07-04 RX ADMIN — ACETAMINOPHEN 650 MG: 325 TABLET, FILM COATED ORAL at 10:00

## 2020-07-04 NOTE — PLAN OF CARE
Breastfeeding assistance visit with Dona. Getting ready for discharge. Brandie reports feeding is going well, they are working on getting a deep latch. Use of lanolin reviewed and sore nipple shells given.  Offered to discuss comfort measures for engorgement, plugged duct treatment, and warning signs of breast infection and patient declined.    Recommend unlimited, frequent breast feedings: At least 8-12 times every 24 hours. Avoid pacifiers and supplementation with formula unless medically indicated. Brandie has a pump for home use. Brandie appreciative of visit.

## 2020-07-04 NOTE — PLAN OF CARE
D: VSS, assessments WDL.   I: Pt. received complete discharge paperwork and home medications as filled by discharge pharmacy.  Pt. was given times of last dose for all discharge medications in writing on discharge medication sheets.  Discharge teaching included home medication, pain management, activity restrictions, postpartum cares, and signs and symptoms of infection.    A: Discharge outcomes on care plan met.  Mother states understanding and comfort with self and baby cares.  P: Pt. discharged to home.  Pt. was discharged with baby, and bands were checked at time of discharge.  Pt. was accompanied by , nurse and baby, and left with personal belongings.  Home care ordered as appropriate for insurance coverage.  Pt. to follow up with OB per MD order.  Pt. had no further questions at the time of discharge and no unmet needs were identified.

## 2020-07-04 NOTE — PROGRESS NOTES
[facility]   Post-Partum Progress Note        Interval History:   Doing well.  Pain is adequately controlled.  No fevers.  No history of foul-smelling vaginal discharge.  Good appetite.  Denies chest pain, shortness of breath, nausea or vomiting.  Vaginal bleeding is similar to a heavy menstrual flow.  Breastfeeding well. Has not had bowel movement yet but is taking stool softeners and Alejandra lax.            Medications:   All medications related to the patient's surgery have been reviewed          Physical Exam:   All vitals stable  Blood pressure 102/62, pulse 65, temperature 97.9  F (36.6  C), temperature source Oral, resp. rate 18, last menstrual period 09/26/2019, SpO2 99 %, unknown if currently breastfeeding.  Uterine fundus is firm, non-tender and at the level of the umbilicus  Episiotomy sutures intact and wound healing well          Data:     Hemoglobin   Date Value Ref Range Status   07/03/2020 11.5 (L) 11.7 - 15.7 g/dL Final   09/19/2017 11.9 11.7 - 15.7 g/dL Final            Assessment and Plan:    Assessment:   Post-partum day #2  Normal spontaneous vaginal delivery  :     Doing well.   Plan:   Discharge later today. Warning signs reviewed. F/U in office in 2 weeks for mood assessment.      Sherrie Jennings CNM

## 2020-07-04 NOTE — DISCHARGE INSTRUCTIONS

## 2020-07-04 NOTE — ANESTHESIA POSTPROCEDURE EVALUATION
Patient: Brandie Pepper    * No procedures listed *    Diagnosis:* No pre-op diagnosis entered *  Diagnosis Additional Information: No value filed.    Anesthesia Type:  No value filed.    Note:  Anesthesia Post Evaluation    Patient location during evaluation: Floor  Patient participation: Able to fully participate in evaluation  Level of consciousness: awake and alert  Pain management: adequate  Airway patency: patent  Cardiovascular status: acceptable  Respiratory status: acceptable  Hydration status: acceptable  PONV: none       Comments: Patient doing well. No headaches, low back pain or residual numbness/paresthesias. Eating and drinking without difficulty. All questions answered. No concerns from patient. No anesthetic complications.           Last vitals:  Vitals:    07/03/20 0806 07/03/20 1752 07/04/20 0200   BP: 104/67 121/48 113/71   Pulse: 56 52 56   Resp: 16 16    Temp: 36.7  C (98  F) 36.8  C (98.3  F) 36.7  C (98  F)   SpO2:            Electronically Signed By: Pauly Ceballos MD  July 4, 2020  3:56 AM

## 2020-07-04 NOTE — PLAN OF CARE
Patient meeting expected goals for this shift.  Using ibuprofen & tylenol for pain/comfort.  Independent in all self and  cares.  Working on breastfeeding .   present at bedside and supportive.  Positive bonding behaviors observed.  Continue to monitor and notify MD as needed.

## 2020-07-04 NOTE — PLAN OF CARE
Vitals within defined limits. Fundus firm. Lochia scant. Up ad maximino. Voiding without issues. Independent with infant cares. Using Tylenol/Motrin for perineal soreness and uterine cramping pain with good relief. Ice packs and tucks used for comfort as well. Breastfeeding infant per cues. Encouraged to call for latch checks. Will continue with plan of care.